# Patient Record
Sex: MALE | Race: WHITE | Employment: OTHER | URBAN - METROPOLITAN AREA
[De-identification: names, ages, dates, MRNs, and addresses within clinical notes are randomized per-mention and may not be internally consistent; named-entity substitution may affect disease eponyms.]

---

## 2017-02-23 ENCOUNTER — HOSPITAL ENCOUNTER (OUTPATIENT)
Dept: LAB | Age: 82
Discharge: HOME | End: 2017-02-23
Payer: MEDICARE

## 2017-02-23 LAB
ANION GAP SERPL CALCULATED.3IONS-SCNC: 10 MEQ/L (ref 7–13)
BUN BLDV-MCNC: 26 MG/DL (ref 8–23)
CALCIUM SERPL-MCNC: 9.4 MG/DL (ref 8.6–10.2)
CHLORIDE BLD-SCNC: 103 MEQ/L (ref 98–107)
CHOLESTEROL, TOTAL: 195 MG/DL (ref 0–199)
CO2: 24 MEQ/L (ref 22–29)
CREAT SERPL-MCNC: 1.2 MG/DL (ref 0.7–1.2)
GFR AFRICAN AMERICAN: >60
GFR NON-AFRICAN AMERICAN: 58
GLUCOSE BLD-MCNC: 87 MG/DL (ref 74–109)
HDLC SERPL-MCNC: 39 MG/DL (ref 40–59)
LDL CHOLESTEROL CALCULATED: 127 MG/DL (ref 0–129)
POTASSIUM SERPL-SCNC: 4.7 MEQ/L (ref 3.5–5.1)
SODIUM BLD-SCNC: 137 MEQ/L (ref 132–144)
TRIGL SERPL-MCNC: 144 MG/DL (ref 0–200)

## 2017-02-23 PROCEDURE — 80048 BASIC METABOLIC PNL TOTAL CA: CPT

## 2017-02-23 PROCEDURE — 36415 COLL VENOUS BLD VENIPUNCTURE: CPT

## 2017-02-23 PROCEDURE — 80061 LIPID PANEL: CPT

## 2017-05-03 ENCOUNTER — TELEPHONE (OUTPATIENT)
Dept: OTHER | Facility: CLINIC | Age: 82
End: 2017-05-03

## 2017-07-25 ENCOUNTER — HOSPITAL ENCOUNTER (OUTPATIENT)
Age: 82
Discharge: HOME | End: 2017-07-25
Payer: MEDICARE

## 2017-07-25 ENCOUNTER — HOSPITAL ENCOUNTER (OUTPATIENT)
Dept: GENERAL RADIOLOGY | Age: 82
Discharge: HOME | End: 2017-07-25
Payer: MEDICARE

## 2017-07-25 DIAGNOSIS — M25.551 RIGHT HIP PAIN: ICD-10-CM

## 2017-07-25 PROCEDURE — 73502 X-RAY EXAM HIP UNI 2-3 VIEWS: CPT

## 2018-02-20 ENCOUNTER — HOSPITAL ENCOUNTER (OUTPATIENT)
Dept: LAB | Age: 83
Discharge: HOME | End: 2018-02-20
Payer: MEDICARE

## 2018-02-20 LAB
ANION GAP SERPL CALCULATED.3IONS-SCNC: 16 MEQ/L (ref 7–13)
BUN BLDV-MCNC: 26 MG/DL (ref 8–23)
CALCIUM SERPL-MCNC: 9.5 MG/DL (ref 8.6–10.2)
CHLORIDE BLD-SCNC: 104 MEQ/L (ref 98–107)
CHOLESTEROL, TOTAL: 161 MG/DL (ref 0–199)
CO2: 23 MEQ/L (ref 22–29)
CREAT SERPL-MCNC: 1.09 MG/DL (ref 0.7–1.2)
GFR AFRICAN AMERICAN: >60
GFR NON-AFRICAN AMERICAN: >60
GLUCOSE BLD-MCNC: 89 MG/DL (ref 74–109)
HDLC SERPL-MCNC: 38 MG/DL (ref 40–59)
LDL CHOLESTEROL CALCULATED: 102 MG/DL (ref 0–129)
POTASSIUM SERPL-SCNC: 4.8 MEQ/L (ref 3.5–5.1)
SODIUM BLD-SCNC: 143 MEQ/L (ref 132–144)
TRIGL SERPL-MCNC: 105 MG/DL (ref 0–200)

## 2018-02-20 PROCEDURE — 80048 BASIC METABOLIC PNL TOTAL CA: CPT

## 2018-02-20 PROCEDURE — 36415 COLL VENOUS BLD VENIPUNCTURE: CPT

## 2018-02-20 PROCEDURE — 80061 LIPID PANEL: CPT

## 2018-02-21 ENCOUNTER — HOSPITAL ENCOUNTER (OUTPATIENT)
Dept: LAB | Age: 83
Discharge: HOME | End: 2018-02-21
Payer: MEDICARE

## 2018-02-21 LAB
HCT VFR BLD CALC: 44.7 % (ref 42–52)
HEMOGLOBIN: 14.9 G/DL (ref 14–18)
MCH RBC QN AUTO: 30.8 PG (ref 27–31.3)
MCHC RBC AUTO-ENTMCNC: 33.3 % (ref 33–37)
MCV RBC AUTO: 92.5 FL (ref 80–100)
PDW BLD-RTO: 14.3 % (ref 11.5–14.5)
PLATELET # BLD: 222 K/UL (ref 130–400)
RBC # BLD: 4.83 M/UL (ref 4.7–6.1)
TSH SERPL DL<=0.05 MIU/L-ACNC: 3.01 UIU/ML (ref 0.27–4.2)
WBC # BLD: 7.2 K/UL (ref 4.8–10.8)

## 2018-02-21 PROCEDURE — 36415 COLL VENOUS BLD VENIPUNCTURE: CPT

## 2018-02-21 PROCEDURE — 85027 COMPLETE CBC AUTOMATED: CPT

## 2018-02-21 PROCEDURE — 84443 ASSAY THYROID STIM HORMONE: CPT

## 2018-03-23 ENCOUNTER — HOSPITAL ENCOUNTER (OUTPATIENT)
Dept: CARDIAC CATH/INVASIVE PROCEDURES | Age: 83
Discharge: HOME | End: 2018-03-23
Attending: INTERNAL MEDICINE | Admitting: INTERNAL MEDICINE
Payer: MEDICARE

## 2018-03-23 VITALS — WEIGHT: 180 LBS | BODY MASS INDEX: 24.38 KG/M2 | HEIGHT: 72 IN

## 2018-03-23 PROBLEM — R06.02 SOB (SHORTNESS OF BREATH) ON EXERTION: Status: ACTIVE | Noted: 2018-03-23

## 2018-03-23 PROBLEM — I25.10 CORONARY ARTERY DISEASE INVOLVING NATIVE CORONARY ARTERY: Status: ACTIVE | Noted: 2018-03-23

## 2018-03-23 PROCEDURE — 6360000002 HC RX W HCPCS

## 2018-03-23 PROCEDURE — 6370000000 HC RX 637 (ALT 250 FOR IP)

## 2018-03-23 PROCEDURE — 2580000003 HC RX 258: Performed by: INTERNAL MEDICINE

## 2018-03-23 PROCEDURE — 2580000003 HC RX 258

## 2018-03-23 PROCEDURE — C1887 CATHETER, GUIDING: HCPCS

## 2018-03-23 PROCEDURE — 2500000003 HC RX 250 WO HCPCS

## 2018-03-23 PROCEDURE — 93458 L HRT ARTERY/VENTRICLE ANGIO: CPT | Performed by: INTERNAL MEDICINE

## 2018-03-23 PROCEDURE — C1769 GUIDE WIRE: HCPCS

## 2018-03-23 PROCEDURE — 92978 ENDOLUMINL IVUS OCT C 1ST: CPT | Performed by: INTERNAL MEDICINE

## 2018-03-23 PROCEDURE — 2720000001 HC MISC SURG SUPPLY STERILE $51-500

## 2018-03-23 PROCEDURE — 85347 COAGULATION TIME ACTIVATED: CPT

## 2018-03-23 PROCEDURE — C1753 CATH, INTRAVAS ULTRASOUND: HCPCS

## 2018-03-23 PROCEDURE — C1894 INTRO/SHEATH, NON-LASER: HCPCS

## 2018-03-23 PROCEDURE — C1725 CATH, TRANSLUMIN NON-LASER: HCPCS

## 2018-03-23 RX ORDER — SODIUM CHLORIDE 0.9 % (FLUSH) 0.9 %
10 SYRINGE (ML) INJECTION PRN
Status: CANCELLED | OUTPATIENT
Start: 2018-03-23

## 2018-03-23 RX ORDER — SODIUM CHLORIDE 0.9 % (FLUSH) 0.9 %
10 SYRINGE (ML) INJECTION PRN
Status: DISCONTINUED | OUTPATIENT
Start: 2018-03-23 | End: 2018-03-23 | Stop reason: HOSPADM

## 2018-03-23 RX ORDER — SODIUM CHLORIDE 0.9 % (FLUSH) 0.9 %
10 SYRINGE (ML) INJECTION EVERY 12 HOURS SCHEDULED
Status: DISCONTINUED | OUTPATIENT
Start: 2018-03-23 | End: 2018-03-23 | Stop reason: HOSPADM

## 2018-03-23 RX ORDER — CETIRIZINE HYDROCHLORIDE 5 MG/1
5 TABLET ORAL DAILY
Status: ON HOLD | COMMUNITY
End: 2021-05-05

## 2018-03-23 RX ORDER — ONDANSETRON 2 MG/ML
4 INJECTION INTRAMUSCULAR; INTRAVENOUS EVERY 6 HOURS PRN
Status: CANCELLED | OUTPATIENT
Start: 2018-03-23

## 2018-03-23 RX ORDER — SODIUM CHLORIDE 0.9 % (FLUSH) 0.9 %
10 SYRINGE (ML) INJECTION EVERY 12 HOURS SCHEDULED
Status: CANCELLED | OUTPATIENT
Start: 2018-03-23

## 2018-03-23 RX ORDER — NITROGLYCERIN 0.4 MG/1
0.4 TABLET SUBLINGUAL EVERY 5 MIN PRN
Status: DISCONTINUED | OUTPATIENT
Start: 2018-03-23 | End: 2018-03-23 | Stop reason: HOSPADM

## 2018-03-23 RX ORDER — BENAZEPRIL HYDROCHLORIDE 5 MG/1
10 TABLET, FILM COATED ORAL DAILY
COMMUNITY
End: 2018-11-20 | Stop reason: ALTCHOICE

## 2018-03-23 RX ORDER — ANTIOX #8/OM3/DHA/EPA/LUT/ZEAX 250-2.5 MG
1 CAPSULE ORAL DAILY
COMMUNITY

## 2018-03-23 RX ORDER — ALPRAZOLAM 0.5 MG/1
0.5 TABLET ORAL
Status: DISCONTINUED | OUTPATIENT
Start: 2018-03-23 | End: 2018-03-23 | Stop reason: HOSPADM

## 2018-03-23 RX ORDER — AMLODIPINE BESYLATE 5 MG/1
5 TABLET ORAL DAILY
COMMUNITY
End: 2019-10-03 | Stop reason: ALTCHOICE

## 2018-03-23 RX ORDER — NITROGLYCERIN 0.4 MG/1
TABLET SUBLINGUAL
Qty: 25 TABLET | Refills: 3 | OUTPATIENT
Start: 2018-03-23

## 2018-03-23 RX ORDER — ACETAMINOPHEN 325 MG/1
650 TABLET ORAL EVERY 4 HOURS PRN
Status: CANCELLED | OUTPATIENT
Start: 2018-03-23

## 2018-03-23 RX ORDER — METOPROLOL SUCCINATE 25 MG/1
25 TABLET, EXTENDED RELEASE ORAL DAILY
COMMUNITY
End: 2018-11-20 | Stop reason: ALTCHOICE

## 2018-03-23 RX ORDER — ASPIRIN 325 MG
325 TABLET ORAL DAILY
Status: ON HOLD | COMMUNITY
End: 2018-03-23 | Stop reason: HOSPADM

## 2018-03-23 RX ORDER — SODIUM CHLORIDE 9 MG/ML
INJECTION, SOLUTION INTRAVENOUS CONTINUOUS
Status: DISCONTINUED | OUTPATIENT
Start: 2018-03-23 | End: 2018-03-23 | Stop reason: HOSPADM

## 2018-03-23 RX ORDER — LOVASTATIN 40 MG/1
40 TABLET ORAL NIGHTLY
COMMUNITY
End: 2018-11-20 | Stop reason: ALTCHOICE

## 2018-03-23 RX ADMIN — SODIUM CHLORIDE: 9 INJECTION, SOLUTION INTRAVENOUS at 10:51

## 2018-03-23 NOTE — OP NOTE
INDICATIONS:  The patient is a 80 y.o. male  with abnormal EKG, and acute on chronic exertional shortness of breath, with markedly abnormal stress test.    PROCEDURE:  Left heart catheterization,selective coronary angiography, left ventriculography and selective right common femoral angiography was performed. Benefits, alternatives and risks of the procedure were explained to the patient to include but not limited to MI, Stroke, Death, Allergic reaction to dye, bleeding, risk of kidney injury, and possible need for emergent coronary artery bypass grafting and informed consent was obtained. The patient was brought to the cath lab and was prepped and draped in the normal sterile technique. IV conscious sedation was maintained. 1% lidocaine was used for local anesthesia. DESCRIPTION OF PROCEDURE: Under local anesthesia, a 5/6 Mongolian short sheath was placed in the right radial artery. All catheter exchanges were made over a 0.035 guidewire. A 5 Mongolian angled pigtail catheter was advanced across the aortic valve into the left ventricle, and left ventriculography was performed. Left ventricular end-diastolic pressure was measured and a slow manual pullback was performed across the aortic valve. A 5 Western Renée Lina catheter was then advanced, and the right coronary artery was selectively engaged. Right coronary angiography was performed in multiple orthogonal views. The 5 [place] catheter would not selectively engage the left main coronary artery, and was removed. A 5 Western Renée JL 3.5 diagnostic catheter was then advanced over a 0.035 guidewire and the left main coronary artery was selectively engaged. Selective coronary angiography of the left system was performed in multiple orthogonal views. At this point, it was decided to proceed with PCI/stenting of the proximal and mid left anterior descending artery.   There was also concern about ostial left main stenosis, and intravascular ultrasound of the next week.     Casey Diaz MD

## 2018-03-23 NOTE — PROGRESS NOTES
Pt arrived to pre/post cath holding in stable condition. R TR band intact intact and R groin dressing clean and dry.

## 2018-03-24 NOTE — H&P
[address]                       [address], [address] [facility]                               HISTORY AND PHYSICAL    PATIENT NAME: Bettina Kiser                     :        1935  MED REC NO:   [id number]                            ROOM:  ACCOUNT NO:   [de-identified]                           ADMIT DATE: 2018  PROVIDER:     Aneesh Whiteside MD    HISTORY OF PRESENT ILLNESS:  The patient is an 30-year-old gentleman who  presented for evaluation of exertional shortness of breath and abnormal  EKG. He quit smoking more than 30 years ago. Probably, he has less than  20-pack year history of smoking. He has had chronic exertional shortness  of breath for the past several years, functional class II, and of late  i.e., over the past 3 to 6 months he has noticed a worsening of his  shortness of breath and a decrease in his functional capacity. His EKG  showed loss of R-wave progression across the anterior precordial leads. A  treadmill stress test was done, and had to be stopped at a very low  workload of 2.5 METS with accelerated chronotropic response and  desaturation with exercise. Blood pressure response to exercise was  normal.  An echocardiogram was performed, preliminary impression is that  the LV systolic function is normal and the valves are grossly normal.  The  patient does have a history of hypertension. FAMILY HISTORY:  For premature coronary artery disease is noncontributory  given patient's age. SOCIAL HISTORY:  The patient denies illicit drugs, he is a former smoker,  and uses caffeinated beverages 2 to 3 times a day. MEDICATIONS:  Amlodipine 5 mg daily, aspirin, benazepril 5 mg daily,  Lovastatin 40 mg daily, metoprolol succinate ER 25 mg daily, and  PreserVision eye drops. ALLERGIES:  He has no known drug allergies. PHYSICAL EXAMINATION:  GENERAL:  He is alert and oriented x3.   VITAL SIGNS:  Shows blood pressure of 130/70 and heart

## 2018-03-25 LAB
PERFORMED ON: ABNORMAL
PERFORMED ON: ABNORMAL
POC ACTIVATED CLOTTING TIME KAOLIN: 202 SEC (ref 82–152)
POC ACTIVATED CLOTTING TIME KAOLIN: 241 SEC (ref 82–152)
POC SAMPLE TYPE: ABNORMAL
POC SAMPLE TYPE: ABNORMAL

## 2018-03-27 ENCOUNTER — HOSPITAL ENCOUNTER (OUTPATIENT)
Dept: CARDIAC CATH/INVASIVE PROCEDURES | Age: 83
Discharge: HOME | End: 2018-03-27
Attending: INTERNAL MEDICINE | Admitting: INTERNAL MEDICINE
Payer: MEDICARE

## 2018-03-27 VITALS — WEIGHT: 180 LBS | HEIGHT: 72 IN | BODY MASS INDEX: 24.38 KG/M2

## 2018-03-27 LAB
EKG ATRIAL RATE: 83 BPM
EKG P AXIS: 28 DEGREES
EKG P-R INTERVAL: 156 MS
EKG Q-T INTERVAL: 366 MS
EKG QRS DURATION: 114 MS
EKG QTC CALCULATION (BAZETT): 430 MS
EKG R AXIS: -51 DEGREES
EKG T AXIS: 69 DEGREES
EKG VENTRICULAR RATE: 83 BPM

## 2018-03-27 PROCEDURE — C1894 INTRO/SHEATH, NON-LASER: HCPCS

## 2018-03-27 PROCEDURE — 2720000001 HC MISC SURG SUPPLY STERILE $51-500

## 2018-03-27 PROCEDURE — 2580000003 HC RX 258

## 2018-03-27 PROCEDURE — C1874 STENT, COATED/COV W/DEL SYS: HCPCS

## 2018-03-27 PROCEDURE — C1760 CLOSURE DEV, VASC: HCPCS

## 2018-03-27 PROCEDURE — 2580000003 HC RX 258: Performed by: INTERNAL MEDICINE

## 2018-03-27 PROCEDURE — 6360000002 HC RX W HCPCS

## 2018-03-27 PROCEDURE — 93005 ELECTROCARDIOGRAM TRACING: CPT

## 2018-03-27 PROCEDURE — 6370000000 HC RX 637 (ALT 250 FOR IP): Performed by: INTERNAL MEDICINE

## 2018-03-27 PROCEDURE — 2500000003 HC RX 250 WO HCPCS

## 2018-03-27 PROCEDURE — C1769 GUIDE WIRE: HCPCS

## 2018-03-27 PROCEDURE — C9600 PERC DRUG-EL COR STENT SING: HCPCS | Performed by: INTERNAL MEDICINE

## 2018-03-27 PROCEDURE — C1725 CATH, TRANSLUMIN NON-LASER: HCPCS

## 2018-03-27 PROCEDURE — C1887 CATHETER, GUIDING: HCPCS

## 2018-03-27 RX ORDER — NITROGLYCERIN 0.4 MG/1
0.4 TABLET SUBLINGUAL EVERY 5 MIN PRN
Status: DISCONTINUED | OUTPATIENT
Start: 2018-03-27 | End: 2018-03-27 | Stop reason: HOSPADM

## 2018-03-27 RX ORDER — ALPRAZOLAM 0.5 MG/1
0.5 TABLET ORAL
Status: DISCONTINUED | OUTPATIENT
Start: 2018-03-27 | End: 2018-03-27 | Stop reason: HOSPADM

## 2018-03-27 RX ORDER — ASPIRIN 81 MG/1
81 TABLET, CHEWABLE ORAL ONCE
Status: COMPLETED | OUTPATIENT
Start: 2018-03-27 | End: 2018-03-27

## 2018-03-27 RX ORDER — SODIUM CHLORIDE 9 MG/ML
INJECTION, SOLUTION INTRAVENOUS CONTINUOUS
Status: CANCELLED | OUTPATIENT
Start: 2018-03-27 | End: 2018-03-27

## 2018-03-27 RX ORDER — ONDANSETRON 2 MG/ML
4 INJECTION INTRAMUSCULAR; INTRAVENOUS EVERY 6 HOURS PRN
Status: CANCELLED | OUTPATIENT
Start: 2018-03-27

## 2018-03-27 RX ORDER — METOPROLOL SUCCINATE 25 MG/1
25 TABLET, EXTENDED RELEASE ORAL ONCE
Status: COMPLETED | OUTPATIENT
Start: 2018-03-27 | End: 2018-03-27

## 2018-03-27 RX ORDER — SODIUM CHLORIDE 9 MG/ML
INJECTION, SOLUTION INTRAVENOUS CONTINUOUS
Status: DISCONTINUED | OUTPATIENT
Start: 2018-03-27 | End: 2018-03-27 | Stop reason: HOSPADM

## 2018-03-27 RX ORDER — SODIUM CHLORIDE 0.9 % (FLUSH) 0.9 %
10 SYRINGE (ML) INJECTION PRN
Status: CANCELLED | OUTPATIENT
Start: 2018-03-27

## 2018-03-27 RX ORDER — DOCUSATE SODIUM 100 MG/1
100 CAPSULE, LIQUID FILLED ORAL 2 TIMES DAILY
Status: CANCELLED | OUTPATIENT
Start: 2018-03-27

## 2018-03-27 RX ORDER — 0.9 % SODIUM CHLORIDE 0.9 %
500 INTRAVENOUS SOLUTION INTRAVENOUS ONCE
Status: CANCELLED | OUTPATIENT
Start: 2018-03-27 | End: 2018-03-27

## 2018-03-27 RX ORDER — ASPIRIN 81 MG/1
81 TABLET ORAL DAILY
Status: CANCELLED | OUTPATIENT
Start: 2018-03-28

## 2018-03-27 RX ORDER — SODIUM CHLORIDE 0.9 % (FLUSH) 0.9 %
10 SYRINGE (ML) INJECTION EVERY 12 HOURS SCHEDULED
Status: CANCELLED | OUTPATIENT
Start: 2018-03-27

## 2018-03-27 RX ORDER — HYDRALAZINE HYDROCHLORIDE 20 MG/ML
10 INJECTION INTRAMUSCULAR; INTRAVENOUS EVERY 10 MIN PRN
Status: CANCELLED | OUTPATIENT
Start: 2018-03-27

## 2018-03-27 RX ORDER — SODIUM CHLORIDE 0.9 % (FLUSH) 0.9 %
10 SYRINGE (ML) INJECTION EVERY 12 HOURS SCHEDULED
Status: DISCONTINUED | OUTPATIENT
Start: 2018-03-27 | End: 2018-03-27 | Stop reason: HOSPADM

## 2018-03-27 RX ORDER — ATROPINE SULFATE 0.4 MG/ML
0.6 AMPUL (ML) INJECTION
Status: CANCELLED | OUTPATIENT
Start: 2018-03-27 | End: 2018-03-27

## 2018-03-27 RX ORDER — ACETAMINOPHEN 325 MG/1
650 TABLET ORAL EVERY 4 HOURS PRN
Status: CANCELLED | OUTPATIENT
Start: 2018-03-27

## 2018-03-27 RX ORDER — ASPIRIN 81 MG/1
81 TABLET, CHEWABLE ORAL DAILY
COMMUNITY

## 2018-03-27 RX ORDER — NITROGLYCERIN 0.4 MG/1
TABLET SUBLINGUAL
Qty: 25 TABLET | Refills: 3 | Status: SHIPPED | OUTPATIENT
Start: 2018-03-27

## 2018-03-27 RX ADMIN — SODIUM CHLORIDE: 9 INJECTION, SOLUTION INTRAVENOUS at 09:12

## 2018-03-27 RX ADMIN — ASPIRIN 81 MG 81 MG: 81 TABLET ORAL at 09:15

## 2018-03-27 RX ADMIN — METOPROLOL SUCCINATE 25 MG: 25 TABLET, FILM COATED, EXTENDED RELEASE ORAL at 09:12

## 2018-03-27 NOTE — DISCHARGE SUMMARY
Hospital Medicine Discharge Summary    Evelyn Whitlock  :  1935  MRN:  [id number]    Admit date:  3/27/2018  Discharge date:  3/27/2018    Admitting Physician:  Tommy Hernandez MD  Primary Care Physician:  Moises Espino MD      Discharge Diagnoses: Active Problems:    * No active hospital problems. *  Resolved Problems:    * No resolved hospital problems. *      PROCEDURES:    Patient underwent PCI and drug-eluting stent to the proximal and mid left anterior descending artery. Please see separate report for details. Hospital Course:   Evelyn Whitlock is a 80 y.o. male that was admitted and treated at [facility] for the following medical issues:   Poor exercise tolerance, abnormal EKG and abnormal stress test.  Recent cardiac catheterization identified flow-limiting disease in the left anterior descending artery distribution and borderline disease in the right coronary artery. Left ventricular ejection fraction preserved patient returns for PCI and stenting of the left anterior descending artery, performed today, see note for details. Intervention was done to the proximal and mid left anterior descending artery using drug-eluting stents. Reactive there were no immediate complications. PHYSICAL EXAM  Alert and oriented, distant breath sounds, regular rate and rhythm, soft and soft ecchymosis right groin. Distal pulses intact  LAB:  Recent Results (from the past 72 hour(s))   Electrocardiogram, 12-lead     Collection Time: 18  9:01 AM   Result Value Ref Range    Ventricular Rate 83 BPM    Atrial Rate 83 BPM    P-R Interval 156 ms    QRS Duration 114 ms    Q-T Interval 366 ms    QTc Calculation (Bazett) 430 ms    P Axis 28 degrees    R Axis -51 degrees    T Axis 69 degrees       Imaging Studies:    No results found. Discharge Medications:  Current Discharge Medication List           Details   nitroGLYCERIN (NITROSTAT) 0.4 MG SL tablet up to max of 3 total doses.  If no relief

## 2018-03-27 NOTE — DISCHARGE INSTR - ACTIVITY
You may shower in AM,  Remove dressing after showering. No driving for 24 hours. Avoid heavy lifting, straining and excessive bending at the catheter insertion site for 48 hours after discharge.

## 2018-03-27 NOTE — OP NOTE
PTCA/SHRUTI to proximal and mid LAD:    Clinical indication: class III angina pectoris, manifesting as exertional shortness of breath. Under local anesthesia is 6 Western Renée short sheath was placed in the right common femoral artery by single anterior percutaneous stick. Selective right common femoral angiography showed that the axis was in the right common femoral artery above its bifurcation. A 6 Monegasque XB 3.5 guide catheter was advanced over 0.035 guidewire and the left main coronary artery was selectively engaged. Heparin was administered to maintain ACT close to 300 seconds. A 0.014,180 centimeters Run-through wire was advanced into the distal left anterior descending artery. A 2.5 x 12 mm trek balloon was advanced into the mid left anterior descending artery and the lesion was dilated to 8 shirley. The balloon was removed. A  3.0 x 18 mm Xience  Alpine drug-eluting stent was then advanced to the mid left anterior descending artery and deployed at 10 shirley. The stent balloon was removed. A 2.75 x 8 mm Xience Alpine drug-eluting stent was then advanced distal to the initial stent, and deployed at 10 shirley with minimal overlap. Overlapping inflation was performed using the stent balloon to 15 shirley. The stent balloon was removed. A 3.0 x 12 mm trek balloon was advanced to the proximal left anterior descending artery and the lesion was dilated to 13 shirley. The balloon was removed. A 4.0 x 12 mm Xience Alpine drug-eluting stent was deployed in the proximal left anterior descending artery at 12 shirley. The stent balloon was removed. A 4.0 x 8 mm noncompliant trek balloon was advanced to the proximal left anterior descending artery and the stent was postdilated to 15 shirley. The stent balloon was removed. Final angiographic results showed 0% residual stenosis with JUAN LABERTO 3 flow and no dissection.   There was some plaque shift into the ostium of the diagonal branch which arose from within the segment of disease in the mid left anterior descending artery, however there was JUAN ALBERTO-3 flow, and further intervention to the ostium of the diagonal branch was deferred at this time. Summary:    PCI and stenting of the mid left anterior descending artery was performed. 90% tubular stenosis was reduced to 0% residual stenosis. 3.0 x 18 mm Xience Alpine drug-eluting stent and 2.75 x 8 mm Xience Alpine drug-eluting stent was deployed in a minimally overlapping fashion. Final results showed 0% residual stenosis with JUAN ALBERTO 3 flow and no dissection. PCI and drug-eluting stent was performed in the proximal left anterior descending artery. The 90% stenosis was reduced to 0% residual stenosis with JUAN ALBERTO 3 flow and no dissection. 4.0 x 12 mm Xience Alpine drug-eluting stent was deployed. Plan at this time is to observe RCA disease. Patient will be followed closely. Ongoing risk factor modification and dual antiplatelet therapy are recommended.

## 2018-03-27 NOTE — PROGRESS NOTES
Patient returned from the cath lab and right groin is soft and dry. But bruised from Friday's procedure. Family is at the bedside.

## 2018-03-28 PROCEDURE — 93010 ELECTROCARDIOGRAM REPORT: CPT | Performed by: INTERNAL MEDICINE

## 2018-04-06 ENCOUNTER — HOSPITAL ENCOUNTER (OUTPATIENT)
Dept: CARDIAC REHAB | Age: 83
Setting detail: THERAPIES SERIES
Discharge: HOME | End: 2018-04-06
Payer: MEDICARE

## 2018-04-06 PROCEDURE — 93798 PHYS/QHP OP CAR RHAB W/ECG: CPT

## 2018-04-09 ENCOUNTER — HOSPITAL ENCOUNTER (OUTPATIENT)
Dept: CARDIAC REHAB | Age: 83
Setting detail: THERAPIES SERIES
Discharge: HOME | End: 2018-04-09
Payer: MEDICARE

## 2018-04-09 PROCEDURE — 93798 PHYS/QHP OP CAR RHAB W/ECG: CPT

## 2018-04-11 ENCOUNTER — HOSPITAL ENCOUNTER (OUTPATIENT)
Dept: CARDIAC REHAB | Age: 83
Setting detail: THERAPIES SERIES
Discharge: HOME | End: 2018-04-11
Payer: MEDICARE

## 2018-04-11 PROCEDURE — 93798 PHYS/QHP OP CAR RHAB W/ECG: CPT

## 2018-04-16 ENCOUNTER — HOSPITAL ENCOUNTER (OUTPATIENT)
Dept: CARDIAC REHAB | Age: 83
Setting detail: THERAPIES SERIES
Discharge: HOME | End: 2018-04-16
Payer: MEDICARE

## 2018-04-16 PROCEDURE — 93798 PHYS/QHP OP CAR RHAB W/ECG: CPT

## 2018-04-18 ENCOUNTER — HOSPITAL ENCOUNTER (OUTPATIENT)
Dept: CARDIAC REHAB | Age: 83
Setting detail: THERAPIES SERIES
Discharge: HOME | End: 2018-04-18
Payer: MEDICARE

## 2018-04-18 PROCEDURE — 93798 PHYS/QHP OP CAR RHAB W/ECG: CPT

## 2018-04-23 ENCOUNTER — HOSPITAL ENCOUNTER (OUTPATIENT)
Dept: CARDIAC REHAB | Age: 83
Setting detail: THERAPIES SERIES
Discharge: HOME | End: 2018-04-23
Payer: MEDICARE

## 2018-04-23 PROCEDURE — 93798 PHYS/QHP OP CAR RHAB W/ECG: CPT

## 2018-04-25 ENCOUNTER — HOSPITAL ENCOUNTER (OUTPATIENT)
Dept: CARDIAC REHAB | Age: 83
Setting detail: THERAPIES SERIES
Discharge: HOME | End: 2018-04-25
Payer: MEDICARE

## 2018-04-25 PROCEDURE — 93798 PHYS/QHP OP CAR RHAB W/ECG: CPT

## 2018-04-27 ENCOUNTER — HOSPITAL ENCOUNTER (OUTPATIENT)
Dept: PULMONOLOGY | Age: 83
Discharge: HOME | End: 2018-04-27
Payer: MEDICARE

## 2018-04-27 PROCEDURE — 6360000002 HC RX W HCPCS: Performed by: INTERNAL MEDICINE

## 2018-04-27 PROCEDURE — 94726 PLETHYSMOGRAPHY LUNG VOLUMES: CPT

## 2018-04-27 PROCEDURE — 94729 DIFFUSING CAPACITY: CPT

## 2018-04-27 PROCEDURE — 94726 PLETHYSMOGRAPHY LUNG VOLUMES: CPT | Performed by: INTERNAL MEDICINE

## 2018-04-27 PROCEDURE — 94060 EVALUATION OF WHEEZING: CPT

## 2018-04-27 PROCEDURE — 94060 EVALUATION OF WHEEZING: CPT | Performed by: INTERNAL MEDICINE

## 2018-04-27 PROCEDURE — 94729 DIFFUSING CAPACITY: CPT | Performed by: INTERNAL MEDICINE

## 2018-04-27 RX ORDER — ALBUTEROL SULFATE 2.5 MG/3ML
2.5 SOLUTION RESPIRATORY (INHALATION) ONCE
Status: COMPLETED | OUTPATIENT
Start: 2018-04-27 | End: 2018-04-27

## 2018-04-27 RX ADMIN — ALBUTEROL SULFATE 2.5 MG: 2.5 SOLUTION RESPIRATORY (INHALATION) at 12:27

## 2018-04-30 ENCOUNTER — HOSPITAL ENCOUNTER (OUTPATIENT)
Dept: CARDIAC REHAB | Age: 83
Setting detail: THERAPIES SERIES
Discharge: HOME | End: 2018-04-30
Payer: MEDICARE

## 2018-04-30 PROCEDURE — 93798 PHYS/QHP OP CAR RHAB W/ECG: CPT

## 2018-05-02 ENCOUNTER — HOSPITAL ENCOUNTER (OUTPATIENT)
Dept: CARDIAC REHAB | Age: 83
Setting detail: THERAPIES SERIES
Discharge: HOME | End: 2018-05-02
Payer: MEDICARE

## 2018-05-02 PROCEDURE — 93798 PHYS/QHP OP CAR RHAB W/ECG: CPT

## 2018-05-07 ENCOUNTER — HOSPITAL ENCOUNTER (OUTPATIENT)
Dept: CARDIAC REHAB | Age: 83
Setting detail: THERAPIES SERIES
Discharge: HOME | End: 2018-05-07
Payer: MEDICARE

## 2018-05-07 PROCEDURE — 93798 PHYS/QHP OP CAR RHAB W/ECG: CPT

## 2018-05-09 ENCOUNTER — HOSPITAL ENCOUNTER (OUTPATIENT)
Dept: CARDIAC REHAB | Age: 83
Setting detail: THERAPIES SERIES
Discharge: HOME | End: 2018-05-09
Payer: MEDICARE

## 2018-05-09 PROCEDURE — 93798 PHYS/QHP OP CAR RHAB W/ECG: CPT

## 2018-05-14 ENCOUNTER — HOSPITAL ENCOUNTER (OUTPATIENT)
Dept: CARDIAC REHAB | Age: 83
Setting detail: THERAPIES SERIES
Discharge: HOME | End: 2018-05-14
Payer: MEDICARE

## 2018-05-14 PROCEDURE — 93798 PHYS/QHP OP CAR RHAB W/ECG: CPT

## 2018-05-16 ENCOUNTER — HOSPITAL ENCOUNTER (OUTPATIENT)
Dept: CARDIAC REHAB | Age: 83
Setting detail: THERAPIES SERIES
Discharge: HOME | End: 2018-05-16
Payer: MEDICARE

## 2018-05-16 PROCEDURE — 93798 PHYS/QHP OP CAR RHAB W/ECG: CPT

## 2018-05-21 ENCOUNTER — HOSPITAL ENCOUNTER (OUTPATIENT)
Dept: CARDIAC REHAB | Age: 83
Setting detail: THERAPIES SERIES
Discharge: HOME | End: 2018-05-21
Payer: MEDICARE

## 2018-05-21 PROCEDURE — 93798 PHYS/QHP OP CAR RHAB W/ECG: CPT

## 2018-05-23 ENCOUNTER — HOSPITAL ENCOUNTER (OUTPATIENT)
Dept: CARDIAC REHAB | Age: 83
Setting detail: THERAPIES SERIES
Discharge: HOME | End: 2018-05-23
Payer: MEDICARE

## 2018-05-23 PROCEDURE — 93798 PHYS/QHP OP CAR RHAB W/ECG: CPT

## 2018-05-29 ENCOUNTER — HOSPITAL ENCOUNTER (OUTPATIENT)
Dept: CARDIAC REHAB | Age: 83
Setting detail: THERAPIES SERIES
Discharge: HOME | End: 2018-05-29
Payer: MEDICARE

## 2018-05-29 PROCEDURE — 93798 PHYS/QHP OP CAR RHAB W/ECG: CPT

## 2018-05-30 ENCOUNTER — HOSPITAL ENCOUNTER (OUTPATIENT)
Dept: CARDIAC REHAB | Age: 83
Setting detail: THERAPIES SERIES
Discharge: HOME | End: 2018-05-30
Payer: MEDICARE

## 2018-05-30 PROCEDURE — 93798 PHYS/QHP OP CAR RHAB W/ECG: CPT

## 2018-06-06 ENCOUNTER — HOSPITAL ENCOUNTER (OUTPATIENT)
Dept: CARDIAC REHAB | Age: 83
Setting detail: THERAPIES SERIES
Discharge: HOME | End: 2018-06-06
Payer: MEDICARE

## 2018-06-06 PROCEDURE — 93798 PHYS/QHP OP CAR RHAB W/ECG: CPT

## 2018-06-07 ENCOUNTER — HOSPITAL ENCOUNTER (OUTPATIENT)
Dept: CARDIAC REHAB | Age: 83
Setting detail: THERAPIES SERIES
Discharge: HOME | End: 2018-06-07
Payer: MEDICARE

## 2018-06-07 PROCEDURE — 93798 PHYS/QHP OP CAR RHAB W/ECG: CPT

## 2018-06-11 ENCOUNTER — HOSPITAL ENCOUNTER (OUTPATIENT)
Dept: CARDIAC REHAB | Age: 83
Setting detail: THERAPIES SERIES
Discharge: HOME | End: 2018-06-11
Payer: MEDICARE

## 2018-06-11 PROCEDURE — 93798 PHYS/QHP OP CAR RHAB W/ECG: CPT

## 2018-06-13 ENCOUNTER — HOSPITAL ENCOUNTER (OUTPATIENT)
Dept: CARDIAC REHAB | Age: 83
Setting detail: THERAPIES SERIES
Discharge: HOME | End: 2018-06-13
Payer: MEDICARE

## 2018-06-13 PROCEDURE — 93798 PHYS/QHP OP CAR RHAB W/ECG: CPT

## 2018-06-18 ENCOUNTER — HOSPITAL ENCOUNTER (OUTPATIENT)
Dept: CARDIAC REHAB | Age: 83
Setting detail: THERAPIES SERIES
Discharge: HOME | End: 2018-06-18
Payer: MEDICARE

## 2018-06-18 PROCEDURE — 93798 PHYS/QHP OP CAR RHAB W/ECG: CPT

## 2018-06-20 ENCOUNTER — HOSPITAL ENCOUNTER (OUTPATIENT)
Dept: CARDIAC REHAB | Age: 83
Setting detail: THERAPIES SERIES
Discharge: HOME | End: 2018-06-20
Payer: MEDICARE

## 2018-06-20 PROCEDURE — 93798 PHYS/QHP OP CAR RHAB W/ECG: CPT

## 2018-06-25 ENCOUNTER — HOSPITAL ENCOUNTER (OUTPATIENT)
Dept: CARDIAC REHAB | Age: 83
Setting detail: THERAPIES SERIES
Discharge: HOME | End: 2018-06-25
Payer: MEDICARE

## 2018-06-25 PROCEDURE — 93798 PHYS/QHP OP CAR RHAB W/ECG: CPT

## 2018-06-27 ENCOUNTER — HOSPITAL ENCOUNTER (OUTPATIENT)
Dept: CARDIAC REHAB | Age: 83
Setting detail: THERAPIES SERIES
Discharge: HOME | End: 2018-06-27
Payer: MEDICARE

## 2018-06-27 PROCEDURE — 93798 PHYS/QHP OP CAR RHAB W/ECG: CPT

## 2018-07-09 ENCOUNTER — HOSPITAL ENCOUNTER (OUTPATIENT)
Dept: CARDIAC REHAB | Age: 83
Setting detail: THERAPIES SERIES
Discharge: HOME | End: 2018-07-09
Payer: MEDICARE

## 2018-07-09 PROCEDURE — 93798 PHYS/QHP OP CAR RHAB W/ECG: CPT

## 2018-07-11 ENCOUNTER — HOSPITAL ENCOUNTER (OUTPATIENT)
Dept: GENERAL RADIOLOGY | Age: 83
Discharge: HOME | End: 2018-07-13
Payer: MEDICARE

## 2018-07-11 ENCOUNTER — OFFICE VISIT (OUTPATIENT)
Dept: PULMONOLOGY | Age: 83
End: 2018-07-11
Payer: MEDICARE

## 2018-07-11 VITALS
DIASTOLIC BLOOD PRESSURE: 74 MMHG | BODY MASS INDEX: 23.16 KG/M2 | HEART RATE: 92 BPM | WEIGHT: 171 LBS | TEMPERATURE: 96.9 F | OXYGEN SATURATION: 95 % | RESPIRATION RATE: 16 BRPM | SYSTOLIC BLOOD PRESSURE: 138 MMHG | HEIGHT: 72 IN

## 2018-07-11 DIAGNOSIS — R09.02 HYPOXIA: ICD-10-CM

## 2018-07-11 DIAGNOSIS — R06.02 SOB (SHORTNESS OF BREATH): Primary | ICD-10-CM

## 2018-07-11 DIAGNOSIS — R06.02 SOB (SHORTNESS OF BREATH): ICD-10-CM

## 2018-07-11 DIAGNOSIS — I25.10 CORONARY ARTERY DISEASE INVOLVING NATIVE CORONARY ARTERY OF NATIVE HEART WITHOUT ANGINA PECTORIS: ICD-10-CM

## 2018-07-11 PROCEDURE — 1123F ACP DISCUSS/DSCN MKR DOCD: CPT | Performed by: INTERNAL MEDICINE

## 2018-07-11 PROCEDURE — 1036F TOBACCO NON-USER: CPT | Performed by: INTERNAL MEDICINE

## 2018-07-11 PROCEDURE — 1101F PT FALLS ASSESS-DOCD LE1/YR: CPT | Performed by: INTERNAL MEDICINE

## 2018-07-11 PROCEDURE — G8420 CALC BMI NORM PARAMETERS: HCPCS | Performed by: INTERNAL MEDICINE

## 2018-07-11 PROCEDURE — 4040F PNEUMOC VAC/ADMIN/RCVD: CPT | Performed by: INTERNAL MEDICINE

## 2018-07-11 PROCEDURE — 99215 OFFICE O/P EST HI 40 MIN: CPT | Performed by: INTERNAL MEDICINE

## 2018-07-11 PROCEDURE — G8598 ASA/ANTIPLAT THER USED: HCPCS | Performed by: INTERNAL MEDICINE

## 2018-07-11 PROCEDURE — 71046 X-RAY EXAM CHEST 2 VIEWS: CPT

## 2018-07-11 PROCEDURE — G8427 DOCREV CUR MEDS BY ELIG CLIN: HCPCS | Performed by: INTERNAL MEDICINE

## 2018-07-11 RX ORDER — CEFDINIR 300 MG/1
300 CAPSULE ORAL 2 TIMES DAILY
COMMUNITY
End: 2019-10-03 | Stop reason: ALTCHOICE

## 2018-07-11 RX ORDER — GUAIFENESIN 600 MG/1
600 TABLET, EXTENDED RELEASE ORAL 2 TIMES DAILY
COMMUNITY
End: 2019-10-03 | Stop reason: ALTCHOICE

## 2018-07-11 ASSESSMENT — ENCOUNTER SYMPTOMS
VOMITING: 0
SORE THROAT: 0
NAUSEA: 0
CHEST TIGHTNESS: 0
WHEEZING: 0
ABDOMINAL PAIN: 0
DIARRHEA: 0
COUGH: 0
VOICE CHANGE: 0
EYE ITCHING: 0
RHINORRHEA: 0
SHORTNESS OF BREATH: 1

## 2018-07-11 NOTE — PROGRESS NOTES
Gastrointestinal: Negative for abdominal pain, diarrhea, nausea and vomiting. Genitourinary: Negative for difficulty urinating and hematuria. Musculoskeletal: Negative for arthralgias, joint swelling and myalgias. Skin: Negative for rash. Allergic/Immunologic: Negative for environmental allergies. Neurological: Negative for dizziness, tremors, weakness and headaches. Psychiatric/Behavioral: Negative for behavioral problems and sleep disturbance. Objective:     Vitals:    07/11/18 1423   BP: 138/74   Pulse: 92   Resp: 16   Temp: 96.9 °F (36.1 °C)   TempSrc: Tympanic   SpO2: 95%   Weight: 171 lb (77.6 kg)   Height: 6' (1.829 m)         Physical Exam   Constitutional: He is oriented to person, place, and time. He appears well-developed and well-nourished. HENT:   Head: Normocephalic and atraumatic. Nose: Nose normal.   Mouth/Throat: Oropharynx is clear and moist.   Eyes: Conjunctivae and EOM are normal. Pupils are equal, round, and reactive to light. Neck: No JVD present. No tracheal deviation present. No thyromegaly present. Cardiovascular: Normal rate and regular rhythm. Exam reveals no gallop and no friction rub. No murmur heard. Pulmonary/Chest: Effort normal and breath sounds normal. No respiratory distress. He has no wheezes. He has no rales. He exhibits no tenderness. diminished Breath sound bilaterally. Abdominal: He exhibits no distension. Musculoskeletal: Normal range of motion. Lymphadenopathy:     He has no cervical adenopathy. Neurological: He is alert and oriented to person, place, and time. No cranial nerve deficit. Skin: Skin is warm and dry. No rash noted. Psychiatric: He has a normal mood and affect. His behavior is normal.       Current Outpatient Prescriptions   Medication Sig Dispense Refill    aspirin 81 MG chewable tablet Take 81 mg by mouth daily      nitroGLYCERIN (NITROSTAT) 0.4 MG SL tablet up to max of 3 total doses.  If no relief after 1 dose, call 911. 25 tablet 3    amLODIPine (NORVASC) 5 MG tablet Take 5 mg by mouth daily      benazepril (LOTENSIN) 5 MG tablet Take 5 mg by mouth daily      cetirizine (ZYRTEC) 5 MG tablet Take 5 mg by mouth daily      lovastatin (MEVACOR) 40 MG tablet Take 40 mg by mouth nightly      metoprolol succinate (TOPROL XL) 25 MG extended release tablet Take 25 mg by mouth daily      Multiple Vitamins-Minerals (PRESERVISION AREDS 2) CAPS Take 1 tablet by mouth daily      ticagrelor (BRILINTA) 90 MG TABS tablet Take 1 tablet by mouth 2 times daily 60 tablet 3    cefdinir (OMNICEF) 300 MG capsule Take 300 mg by mouth 2 times daily      guaiFENesin (MUCINEX) 600 MG extended release tablet Take 600 mg by mouth 2 times daily       No current facility-administered medications for this visit. Results for orders placed during the hospital encounter of 11/15/12   X-ray chest PA and lateral    Narrative X-RAY A CHEST, 2 VIEWS       CLINICAL HISTORY Cough and short of breath     COMPARISONS None available. FINDINGS  Normal cardiac silhouette. There are atherosclerotic  calcifications in the aortic arch. There is coarsening of interstitial  markings in the mid and lung bases, most pronounced in lung bases. No  consolidating infiltrate. No pleural effusion or pneumothorax. The bony  thorax is unremarkable. IMPRESSION COARSENED INTERSTITIAL MARKINGS. NO CONSOLIDATING INFILTRATE. Micah Henry By- Anjali Hoyt M.D. Released By- Anjali Hoyt M.D. Released Date Time- 11/15/12 1119   This document has been electronically signed. ------------------------------------------------------------------------------       Assessment/Plan:     1. SOB (shortness of breath)  C/o shortness of breath , worse with exertion, carrying stuff and climbing stairs. He has no recent CXR. PFT show severely impaired diffusion capacity.  PFT reviewed with patient  Last CxR coarsen interstitial marking, ? Pulmonary  Fibrosis, check repeat CXR and if needed HRCT orf the chest.    2. Hypoxia  Start on 2 lit O2 with sleep and activity. He has ambulatory pulse oxymetry show low O2 86%. He is having short of breath with exertion mostly getting stopped    3. Coronary artery disease involving native coronary artery of native heart without angina pectoris  Patient had stent placed in 3/18 by dr. Bar Lose       No Follow-up on file.       Missy Mederos MD

## 2018-07-13 ENCOUNTER — HOSPITAL ENCOUNTER (OUTPATIENT)
Dept: CARDIAC REHAB | Age: 83
Setting detail: THERAPIES SERIES
Discharge: HOME | End: 2018-07-13
Payer: MEDICARE

## 2018-07-13 PROCEDURE — 93798 PHYS/QHP OP CAR RHAB W/ECG: CPT

## 2018-07-16 ENCOUNTER — HOSPITAL ENCOUNTER (OUTPATIENT)
Dept: CARDIAC REHAB | Age: 83
Setting detail: THERAPIES SERIES
Discharge: HOME | End: 2018-07-16
Payer: MEDICARE

## 2018-07-16 PROCEDURE — 93798 PHYS/QHP OP CAR RHAB W/ECG: CPT

## 2018-07-18 ENCOUNTER — HOSPITAL ENCOUNTER (OUTPATIENT)
Dept: CARDIAC REHAB | Age: 83
Setting detail: THERAPIES SERIES
Discharge: HOME | End: 2018-07-18
Payer: MEDICARE

## 2018-07-18 PROCEDURE — 93798 PHYS/QHP OP CAR RHAB W/ECG: CPT

## 2018-07-23 ENCOUNTER — HOSPITAL ENCOUNTER (OUTPATIENT)
Dept: CARDIAC REHAB | Age: 83
Setting detail: THERAPIES SERIES
Discharge: HOME | End: 2018-07-23
Payer: MEDICARE

## 2018-07-23 PROCEDURE — 93798 PHYS/QHP OP CAR RHAB W/ECG: CPT

## 2018-07-25 ENCOUNTER — HOSPITAL ENCOUNTER (OUTPATIENT)
Dept: CARDIAC REHAB | Age: 83
Setting detail: THERAPIES SERIES
Discharge: HOME | End: 2018-07-25
Payer: MEDICARE

## 2018-07-25 ENCOUNTER — OFFICE VISIT (OUTPATIENT)
Dept: PULMONOLOGY | Age: 83
End: 2018-07-25
Payer: MEDICARE

## 2018-07-25 VITALS
DIASTOLIC BLOOD PRESSURE: 72 MMHG | OXYGEN SATURATION: 99 % | HEIGHT: 72 IN | BODY MASS INDEX: 23.3 KG/M2 | TEMPERATURE: 97.7 F | WEIGHT: 172 LBS | SYSTOLIC BLOOD PRESSURE: 120 MMHG | HEART RATE: 85 BPM | RESPIRATION RATE: 16 BRPM

## 2018-07-25 DIAGNOSIS — R09.02 HYPOXIA: ICD-10-CM

## 2018-07-25 DIAGNOSIS — J44.9 CHRONIC OBSTRUCTIVE PULMONARY DISEASE, UNSPECIFIED COPD TYPE (HCC): Primary | ICD-10-CM

## 2018-07-25 DIAGNOSIS — R06.00 DYSPNEA, UNSPECIFIED TYPE: ICD-10-CM

## 2018-07-25 PROCEDURE — 99214 OFFICE O/P EST MOD 30 MIN: CPT | Performed by: INTERNAL MEDICINE

## 2018-07-25 PROCEDURE — 3023F SPIROM DOC REV: CPT | Performed by: INTERNAL MEDICINE

## 2018-07-25 PROCEDURE — 1101F PT FALLS ASSESS-DOCD LE1/YR: CPT | Performed by: INTERNAL MEDICINE

## 2018-07-25 PROCEDURE — 93798 PHYS/QHP OP CAR RHAB W/ECG: CPT

## 2018-07-25 PROCEDURE — 4040F PNEUMOC VAC/ADMIN/RCVD: CPT | Performed by: INTERNAL MEDICINE

## 2018-07-25 PROCEDURE — 1123F ACP DISCUSS/DSCN MKR DOCD: CPT | Performed by: INTERNAL MEDICINE

## 2018-07-25 PROCEDURE — 1036F TOBACCO NON-USER: CPT | Performed by: INTERNAL MEDICINE

## 2018-07-25 PROCEDURE — G8926 SPIRO NO PERF OR DOC: HCPCS | Performed by: INTERNAL MEDICINE

## 2018-07-25 PROCEDURE — G8427 DOCREV CUR MEDS BY ELIG CLIN: HCPCS | Performed by: INTERNAL MEDICINE

## 2018-07-25 PROCEDURE — G8420 CALC BMI NORM PARAMETERS: HCPCS | Performed by: INTERNAL MEDICINE

## 2018-07-25 PROCEDURE — G8598 ASA/ANTIPLAT THER USED: HCPCS | Performed by: INTERNAL MEDICINE

## 2018-07-25 ASSESSMENT — ENCOUNTER SYMPTOMS
NAUSEA: 0
EYE ITCHING: 0
VOICE CHANGE: 0
DIARRHEA: 0
CHEST TIGHTNESS: 0
COUGH: 0
ABDOMINAL PAIN: 0
SORE THROAT: 0
VOMITING: 0
SHORTNESS OF BREATH: 1
WHEEZING: 0
RHINORRHEA: 0

## 2018-07-25 NOTE — PROGRESS NOTES
Subjective:     Indy Mejia is a 80 y.o. male who complains today of:     Chief Complaint   Patient presents with    Follow-up     f/u for Chest Xray results. HPI  CXR done 7/11/18  C/o shortness of breath with exertion. No Wheezing   Cough with  Clear Sputum in morning   No Hemoptysis  No Chest tightness   No Chest pain with radiation  or pleuritic pain  No Fever or chills. No Rhinorrhea and postnasal drip. Patient is not on any inhaler, he was started on O2 but he Is not using it. He has pulse oxymetry at home and O2 sat goes down to 88 or below with ambulation. Allergies:  Patient has no known allergies. Past Medical History:   Diagnosis Date    Arthritis     CAD (coronary artery disease)     Hyperlipidemia     Hypertension      History reviewed. No pertinent surgical history. Family History   Problem Relation Age of Onset    Cancer Father     Stroke Father      Social History     Social History    Marital status:      Spouse name: N/A    Number of children: N/A    Years of education: N/A     Occupational History    Not on file. Social History Main Topics    Smoking status: Former Smoker    Smokeless tobacco: Never Used      Comment: quit smoking 1981    Alcohol use No    Drug use: No    Sexual activity: Not Currently     Partners: Female     Other Topics Concern    Not on file     Social History Narrative    No narrative on file         Review of Systems   Constitutional: Negative for chills, diaphoresis, fatigue and fever. HENT: Negative for congestion, mouth sores, nosebleeds, postnasal drip, rhinorrhea, sneezing, sore throat and voice change. Eyes: Negative for itching and visual disturbance. Respiratory: Positive for shortness of breath. Negative for cough, chest tightness and wheezing. Cardiovascular: Negative. Negative for chest pain, palpitations and leg swelling.    Gastrointestinal: Negative for abdominal pain, diarrhea, nausea and extended release tablet Take 600 mg by mouth 2 times daily      aspirin 81 MG chewable tablet Take 81 mg by mouth daily      nitroGLYCERIN (NITROSTAT) 0.4 MG SL tablet up to max of 3 total doses. If no relief after 1 dose, call 911. 25 tablet 3    amLODIPine (NORVASC) 5 MG tablet Take 5 mg by mouth daily      benazepril (LOTENSIN) 5 MG tablet Take 10 mg by mouth daily       cetirizine (ZYRTEC) 5 MG tablet Take 5 mg by mouth daily      lovastatin (MEVACOR) 40 MG tablet Take 40 mg by mouth nightly      metoprolol succinate (TOPROL XL) 25 MG extended release tablet Take 25 mg by mouth daily      Multiple Vitamins-Minerals (PRESERVISION AREDS 2) CAPS Take 1 tablet by mouth daily      ticagrelor (BRILINTA) 90 MG TABS tablet Take 1 tablet by mouth 2 times daily 60 tablet 3     No current facility-administered medications for this visit. PULMONARY FUNCTION     PATIENT NAME: Lauri Christy                     :        1935  MED REC NO:   [id number]                            ROOM:  ACCOUNT NO:   [de-identified]                           ADMIT DATE: 2018  PROVIDER:     Gilmer Campo MD     DATE OF PROCEDURE:  2018     PFTs were done on this 80year-old gentleman who is 6 feet tall, weighs 170  pounds with 27-year smoking history, quit 37 years ago, presenting with  dyspnea and cough.     Spirometry showed a forced vital capacity of 3.96 L which is 93% of  predicted. FEV1 was 2.53 L which is 84% of predicted with FEV1/FVC ratio  being reduced to 64%. FEF 25-75% was reduced to 1.33 L per second which is  66% of predicted. No improvement noted after bronchodilator therapy. MVV  was within normal limits.     Lung volumes done by body plethysmography showed normal total lung capacity  of 6.99 L which is 93% of predicted. Residual volume was 3.18 L which is  112% of predicted. RV/TLC ratio was normal at 45%.      Diffusion capacity was severely reduced to 8.58 which is 39% of predicted.     Airway resistance was normal.  Specific airway conductance was slightly  reduced.     OVERALL IMPRESSION:  This study shows evidence of mild obstructive  ventilatory impairment without reversibility associated with severe  diffusion impairment. COPD with emphysema versus other causes of severe  diffusion abnormality should be considered. Clinical correlation is  needed.        Nayeli Oneal MD  Results for orders placed during the hospital encounter of 07/11/18   XR CHEST STANDARD (2 VW)    Narrative Chest X-ray, 2 views    Clinical information:  Shortness of breath    Comparison:  November 15, 2012     Findings     Osseous structures intact. Cardiopericardial silhouette is normal. Pulmonary vasculature is normal. Lucent area projects cephalad and posterior to cardiac silhouette. Mild flattening the diaphragms and increased lung volumes. Stranding left lung base. Ill-defined area increased opacity right lung base. Impression     Bibasilar scarring versus subsegmental atelectasis/pneumonia. Emphysema           Assessment/Plan:     1. Chronic obstructive pulmonary disease, unspecified COPD type ([address]  Patient is having exertional shortness of breath , PFT show COPD and decreased DLCO. Start on Anoro ellipta 1 puff daily. CXR show emphysema , bibasilar scarring     2. Hypoxia  He has hypoxia with ambulation , recommend to use O2 with sleep and activity. Keep Spo2 92% or above. 3. Dyspnea, unspecified type  Patient is having  Chronic shortness of which is likely due to hypoxia and copd related. continue  Bronchodilator, O2 as requested. Sol Gutierrez keep  Spo2 92% or above. Return in about 4 months (around 11/25/2018) for hypoxia on O2, COPD.       Kalen Mcdonald MD

## 2018-07-30 ENCOUNTER — HOSPITAL ENCOUNTER (OUTPATIENT)
Dept: CARDIAC REHAB | Age: 83
Setting detail: THERAPIES SERIES
Discharge: HOME | End: 2018-07-30
Payer: MEDICARE

## 2018-07-30 PROCEDURE — 93798 PHYS/QHP OP CAR RHAB W/ECG: CPT

## 2018-08-01 ENCOUNTER — HOSPITAL ENCOUNTER (OUTPATIENT)
Dept: CARDIAC REHAB | Age: 83
Setting detail: THERAPIES SERIES
Discharge: HOME | End: 2018-08-01
Payer: MEDICARE

## 2018-08-01 PROCEDURE — 93798 PHYS/QHP OP CAR RHAB W/ECG: CPT

## 2018-08-02 ENCOUNTER — HOSPITAL ENCOUNTER (OUTPATIENT)
Dept: LAB | Age: 83
Discharge: HOME | End: 2018-08-02
Payer: MEDICARE

## 2018-08-02 LAB
ANION GAP SERPL CALCULATED.3IONS-SCNC: 14 MEQ/L (ref 7–13)
BUN BLDV-MCNC: 31 MG/DL (ref 8–23)
CALCIUM SERPL-MCNC: 9.3 MG/DL (ref 8.6–10.2)
CHLORIDE BLD-SCNC: 104 MEQ/L (ref 98–107)
CO2: 23 MEQ/L (ref 22–29)
CREAT SERPL-MCNC: 1.46 MG/DL (ref 0.7–1.2)
GFR AFRICAN AMERICAN: 55.8
GFR NON-AFRICAN AMERICAN: 46.1
GLUCOSE BLD-MCNC: 81 MG/DL (ref 74–109)
POTASSIUM SERPL-SCNC: 5.1 MEQ/L (ref 3.5–5.1)
SODIUM BLD-SCNC: 141 MEQ/L (ref 132–144)

## 2018-08-02 PROCEDURE — 36415 COLL VENOUS BLD VENIPUNCTURE: CPT

## 2018-08-02 PROCEDURE — 80048 BASIC METABOLIC PNL TOTAL CA: CPT

## 2018-08-06 ENCOUNTER — HOSPITAL ENCOUNTER (OUTPATIENT)
Dept: CARDIAC REHAB | Age: 83
Setting detail: THERAPIES SERIES
Discharge: HOME | End: 2018-08-06
Payer: MEDICARE

## 2018-08-06 PROCEDURE — 93798 PHYS/QHP OP CAR RHAB W/ECG: CPT

## 2018-08-08 ENCOUNTER — HOSPITAL ENCOUNTER (OUTPATIENT)
Dept: CARDIAC REHAB | Age: 83
Setting detail: THERAPIES SERIES
Discharge: HOME | End: 2018-08-08
Payer: MEDICARE

## 2018-08-08 PROCEDURE — 93798 PHYS/QHP OP CAR RHAB W/ECG: CPT

## 2018-08-13 ENCOUNTER — HOSPITAL ENCOUNTER (OUTPATIENT)
Dept: CARDIAC REHAB | Age: 83
Setting detail: THERAPIES SERIES
Discharge: HOME | End: 2018-08-13
Payer: MEDICARE

## 2018-08-13 PROCEDURE — 93798 PHYS/QHP OP CAR RHAB W/ECG: CPT

## 2018-09-13 ENCOUNTER — HOSPITAL ENCOUNTER (OUTPATIENT)
Dept: LAB | Age: 83
Discharge: HOME | End: 2018-09-13
Payer: MEDICARE

## 2018-09-13 LAB
ANION GAP SERPL CALCULATED.3IONS-SCNC: 18 MEQ/L (ref 7–13)
BUN BLDV-MCNC: 19 MG/DL (ref 8–23)
CALCIUM SERPL-MCNC: 9.4 MG/DL (ref 8.6–10.2)
CHLORIDE BLD-SCNC: 105 MEQ/L (ref 98–107)
CO2: 20 MEQ/L (ref 22–29)
CREAT SERPL-MCNC: 1.36 MG/DL (ref 0.7–1.2)
GFR AFRICAN AMERICAN: >60
GFR NON-AFRICAN AMERICAN: 50
GLUCOSE BLD-MCNC: 90 MG/DL (ref 74–109)
POTASSIUM SERPL-SCNC: 4.6 MEQ/L (ref 3.5–5.1)
SODIUM BLD-SCNC: 143 MEQ/L (ref 132–144)

## 2018-09-13 PROCEDURE — 36415 COLL VENOUS BLD VENIPUNCTURE: CPT

## 2018-09-13 PROCEDURE — 80048 BASIC METABOLIC PNL TOTAL CA: CPT

## 2018-11-20 ENCOUNTER — OFFICE VISIT (OUTPATIENT)
Dept: PULMONOLOGY | Age: 83
End: 2018-11-20
Payer: MEDICARE

## 2018-11-20 VITALS
DIASTOLIC BLOOD PRESSURE: 78 MMHG | RESPIRATION RATE: 16 BRPM | SYSTOLIC BLOOD PRESSURE: 132 MMHG | HEART RATE: 77 BPM | OXYGEN SATURATION: 94 % | BODY MASS INDEX: 24.24 KG/M2 | WEIGHT: 179 LBS | HEIGHT: 72 IN | TEMPERATURE: 97.4 F

## 2018-11-20 DIAGNOSIS — R06.02 SOB (SHORTNESS OF BREATH): ICD-10-CM

## 2018-11-20 DIAGNOSIS — R09.02 HYPOXIA: ICD-10-CM

## 2018-11-20 DIAGNOSIS — J44.9 CHRONIC OBSTRUCTIVE PULMONARY DISEASE, UNSPECIFIED COPD TYPE (HCC): Primary | ICD-10-CM

## 2018-11-20 PROCEDURE — 1101F PT FALLS ASSESS-DOCD LE1/YR: CPT | Performed by: INTERNAL MEDICINE

## 2018-11-20 PROCEDURE — G8926 SPIRO NO PERF OR DOC: HCPCS | Performed by: INTERNAL MEDICINE

## 2018-11-20 PROCEDURE — G8598 ASA/ANTIPLAT THER USED: HCPCS | Performed by: INTERNAL MEDICINE

## 2018-11-20 PROCEDURE — 3023F SPIROM DOC REV: CPT | Performed by: INTERNAL MEDICINE

## 2018-11-20 PROCEDURE — G8484 FLU IMMUNIZE NO ADMIN: HCPCS | Performed by: INTERNAL MEDICINE

## 2018-11-20 PROCEDURE — 1036F TOBACCO NON-USER: CPT | Performed by: INTERNAL MEDICINE

## 2018-11-20 PROCEDURE — G8420 CALC BMI NORM PARAMETERS: HCPCS | Performed by: INTERNAL MEDICINE

## 2018-11-20 PROCEDURE — 4040F PNEUMOC VAC/ADMIN/RCVD: CPT | Performed by: INTERNAL MEDICINE

## 2018-11-20 PROCEDURE — 1123F ACP DISCUSS/DSCN MKR DOCD: CPT | Performed by: INTERNAL MEDICINE

## 2018-11-20 PROCEDURE — 99214 OFFICE O/P EST MOD 30 MIN: CPT | Performed by: INTERNAL MEDICINE

## 2018-11-20 PROCEDURE — G8427 DOCREV CUR MEDS BY ELIG CLIN: HCPCS | Performed by: INTERNAL MEDICINE

## 2018-11-20 RX ORDER — DILTIAZEM HYDROCHLORIDE 120 MG/1
120 CAPSULE, EXTENDED RELEASE ORAL DAILY
Refills: 0 | COMMUNITY
Start: 2018-11-10 | End: 2020-02-05 | Stop reason: DRUGHIGH

## 2018-11-20 RX ORDER — ATORVASTATIN CALCIUM 40 MG/1
40 TABLET, FILM COATED ORAL DAILY
Refills: 0 | COMMUNITY
Start: 2018-11-17

## 2018-11-20 ASSESSMENT — ENCOUNTER SYMPTOMS
SORE THROAT: 0
COUGH: 1
ABDOMINAL PAIN: 0
WHEEZING: 0
SHORTNESS OF BREATH: 1
VOICE CHANGE: 0
DIARRHEA: 0
EYE ITCHING: 0
CHEST TIGHTNESS: 0
NAUSEA: 0
RHINORRHEA: 0
VOMITING: 0

## 2019-01-04 ENCOUNTER — HOSPITAL ENCOUNTER (OUTPATIENT)
Dept: LAB | Age: 84
Discharge: HOME | End: 2019-01-04
Payer: MEDICARE

## 2019-01-04 LAB
ANION GAP SERPL CALCULATED.3IONS-SCNC: 12 MEQ/L (ref 7–13)
BUN BLDV-MCNC: 23 MG/DL (ref 8–23)
CALCIUM SERPL-MCNC: 9.3 MG/DL (ref 8.6–10.2)
CHLORIDE BLD-SCNC: 105 MEQ/L (ref 98–107)
CO2: 25 MEQ/L (ref 22–29)
CREAT SERPL-MCNC: 1.5 MG/DL (ref 0.7–1.2)
GFR AFRICAN AMERICAN: 54
GFR NON-AFRICAN AMERICAN: 44.6
GLUCOSE BLD-MCNC: 92 MG/DL (ref 74–109)
POTASSIUM SERPL-SCNC: 4.6 MEQ/L (ref 3.5–5.1)
SODIUM BLD-SCNC: 142 MEQ/L (ref 132–144)

## 2019-01-04 PROCEDURE — 36415 COLL VENOUS BLD VENIPUNCTURE: CPT

## 2019-01-04 PROCEDURE — 80048 BASIC METABOLIC PNL TOTAL CA: CPT

## 2019-03-07 ENCOUNTER — HOSPITAL ENCOUNTER (OUTPATIENT)
Dept: LAB | Age: 84
Discharge: HOME | End: 2019-03-07
Payer: MEDICARE

## 2019-03-07 LAB
ANION GAP SERPL CALCULATED.3IONS-SCNC: 13 MEQ/L (ref 9–15)
BUN BLDV-MCNC: 24 MG/DL (ref 8–23)
CALCIUM SERPL-MCNC: 9.4 MG/DL (ref 8.5–9.9)
CHLORIDE BLD-SCNC: 105 MEQ/L (ref 95–107)
CHOLESTEROL, TOTAL: 117 MG/DL (ref 0–199)
CO2: 22 MEQ/L (ref 20–31)
CREAT SERPL-MCNC: 1.23 MG/DL (ref 0.7–1.2)
GFR AFRICAN AMERICAN: >60
GFR NON-AFRICAN AMERICAN: 56.1
GLUCOSE BLD-MCNC: 87 MG/DL (ref 70–99)
HDLC SERPL-MCNC: 39 MG/DL (ref 40–59)
LDL CHOLESTEROL CALCULATED: 62 MG/DL (ref 0–129)
POTASSIUM SERPL-SCNC: 4.7 MEQ/L (ref 3.4–4.9)
SODIUM BLD-SCNC: 140 MEQ/L (ref 135–144)
TRIGL SERPL-MCNC: 82 MG/DL (ref 0–150)
TSH SERPL DL<=0.05 MIU/L-ACNC: 3.93 UIU/ML (ref 0.44–3.86)

## 2019-03-07 PROCEDURE — 36415 COLL VENOUS BLD VENIPUNCTURE: CPT

## 2019-03-07 PROCEDURE — 80061 LIPID PANEL: CPT

## 2019-03-07 PROCEDURE — 80048 BASIC METABOLIC PNL TOTAL CA: CPT

## 2019-03-07 PROCEDURE — 84443 ASSAY THYROID STIM HORMONE: CPT

## 2019-03-26 ENCOUNTER — OFFICE VISIT (OUTPATIENT)
Dept: PULMONOLOGY | Age: 84
End: 2019-03-26
Payer: MEDICARE

## 2019-03-26 VITALS
HEART RATE: 91 BPM | TEMPERATURE: 95.5 F | BODY MASS INDEX: 23.43 KG/M2 | DIASTOLIC BLOOD PRESSURE: 62 MMHG | HEIGHT: 72 IN | OXYGEN SATURATION: 97 % | RESPIRATION RATE: 16 BRPM | WEIGHT: 173 LBS | SYSTOLIC BLOOD PRESSURE: 124 MMHG

## 2019-03-26 DIAGNOSIS — R94.2 ABNORMAL PFT: ICD-10-CM

## 2019-03-26 DIAGNOSIS — R06.02 SOB (SHORTNESS OF BREATH): ICD-10-CM

## 2019-03-26 DIAGNOSIS — R09.02 HYPOXIA: ICD-10-CM

## 2019-03-26 DIAGNOSIS — J44.9 CHRONIC OBSTRUCTIVE PULMONARY DISEASE, UNSPECIFIED COPD TYPE (HCC): Primary | ICD-10-CM

## 2019-03-26 PROCEDURE — 4040F PNEUMOC VAC/ADMIN/RCVD: CPT | Performed by: INTERNAL MEDICINE

## 2019-03-26 PROCEDURE — 1123F ACP DISCUSS/DSCN MKR DOCD: CPT | Performed by: INTERNAL MEDICINE

## 2019-03-26 PROCEDURE — G8926 SPIRO NO PERF OR DOC: HCPCS | Performed by: INTERNAL MEDICINE

## 2019-03-26 PROCEDURE — 99214 OFFICE O/P EST MOD 30 MIN: CPT | Performed by: INTERNAL MEDICINE

## 2019-03-26 PROCEDURE — 3023F SPIROM DOC REV: CPT | Performed by: INTERNAL MEDICINE

## 2019-03-26 PROCEDURE — G8420 CALC BMI NORM PARAMETERS: HCPCS | Performed by: INTERNAL MEDICINE

## 2019-03-26 PROCEDURE — G8484 FLU IMMUNIZE NO ADMIN: HCPCS | Performed by: INTERNAL MEDICINE

## 2019-03-26 PROCEDURE — G8598 ASA/ANTIPLAT THER USED: HCPCS | Performed by: INTERNAL MEDICINE

## 2019-03-26 PROCEDURE — G8427 DOCREV CUR MEDS BY ELIG CLIN: HCPCS | Performed by: INTERNAL MEDICINE

## 2019-03-26 PROCEDURE — 1036F TOBACCO NON-USER: CPT | Performed by: INTERNAL MEDICINE

## 2019-03-26 ASSESSMENT — ENCOUNTER SYMPTOMS
SORE THROAT: 0
ABDOMINAL PAIN: 0
NAUSEA: 0
VOICE CHANGE: 0
CHEST TIGHTNESS: 0
VOMITING: 0
RHINORRHEA: 0
SHORTNESS OF BREATH: 1
COUGH: 0
WHEEZING: 0
EYE ITCHING: 0
DIARRHEA: 0

## 2019-04-02 ENCOUNTER — HOSPITAL ENCOUNTER (OUTPATIENT)
Dept: LAB | Age: 84
Discharge: HOME | End: 2019-04-02
Payer: MEDICARE

## 2019-04-02 LAB — TSH SERPL DL<=0.05 MIU/L-ACNC: 2.77 UIU/ML (ref 0.44–3.86)

## 2019-04-02 PROCEDURE — 84443 ASSAY THYROID STIM HORMONE: CPT

## 2019-04-02 PROCEDURE — 36415 COLL VENOUS BLD VENIPUNCTURE: CPT

## 2019-04-26 ENCOUNTER — HOSPITAL ENCOUNTER (OUTPATIENT)
Dept: LAB | Age: 84
Discharge: HOME | End: 2019-04-26
Payer: MEDICARE

## 2019-04-26 LAB
ANION GAP SERPL CALCULATED.3IONS-SCNC: 16 MEQ/L (ref 9–15)
BUN BLDV-MCNC: 30 MG/DL (ref 8–23)
CALCIUM SERPL-MCNC: 9.2 MG/DL (ref 8.5–9.9)
CHLORIDE BLD-SCNC: 106 MEQ/L (ref 95–107)
CO2: 22 MEQ/L (ref 20–31)
CREAT SERPL-MCNC: 1.43 MG/DL (ref 0.7–1.2)
GFR AFRICAN AMERICAN: 57
GFR NON-AFRICAN AMERICAN: 47.1
GLUCOSE BLD-MCNC: 90 MG/DL (ref 70–99)
HCT VFR BLD CALC: 39.6 % (ref 42–52)
HEMOGLOBIN: 13.4 G/DL (ref 14–18)
MCH RBC QN AUTO: 32.1 PG (ref 27–31.3)
MCHC RBC AUTO-ENTMCNC: 33.8 % (ref 33–37)
MCV RBC AUTO: 94.9 FL (ref 80–100)
PDW BLD-RTO: 15.3 % (ref 11.5–14.5)
PLATELET # BLD: 208 K/UL (ref 130–400)
POTASSIUM SERPL-SCNC: 4.6 MEQ/L (ref 3.4–4.9)
RBC # BLD: 4.17 M/UL (ref 4.7–6.1)
SODIUM BLD-SCNC: 144 MEQ/L (ref 135–144)
WBC # BLD: 6.7 K/UL (ref 4.8–10.8)

## 2019-04-26 PROCEDURE — 85027 COMPLETE CBC AUTOMATED: CPT

## 2019-04-26 PROCEDURE — 36415 COLL VENOUS BLD VENIPUNCTURE: CPT

## 2019-04-26 PROCEDURE — 80048 BASIC METABOLIC PNL TOTAL CA: CPT

## 2019-04-29 ENCOUNTER — HOSPITAL ENCOUNTER (OUTPATIENT)
Dept: CARDIAC CATH/INVASIVE PROCEDURES | Age: 84
Discharge: HOME | End: 2019-04-29
Attending: INTERNAL MEDICINE | Admitting: INTERNAL MEDICINE
Payer: MEDICARE

## 2019-04-29 VITALS — OXYGEN SATURATION: 95 % | RESPIRATION RATE: 12 BRPM | HEART RATE: 66 BPM

## 2019-04-29 DIAGNOSIS — I25.10 CORONARY ARTERY DISEASE INVOLVING NATIVE CORONARY ARTERY, ANGINA PRESENCE UNSPECIFIED, UNSPECIFIED WHETHER NATIVE OR TRANSPLANTED HEART: Primary | ICD-10-CM

## 2019-04-29 LAB
ANION GAP SERPL CALCULATED.3IONS-SCNC: 15 MEQ/L (ref 9–15)
BUN BLDV-MCNC: 24 MG/DL (ref 8–23)
CALCIUM SERPL-MCNC: 9.3 MG/DL (ref 8.5–9.9)
CHLORIDE BLD-SCNC: 106 MEQ/L (ref 95–107)
CO2: 21 MEQ/L (ref 20–31)
CREAT SERPL-MCNC: 1.17 MG/DL (ref 0.7–1.2)
EKG ATRIAL RATE: 67 BPM
EKG P AXIS: 56 DEGREES
EKG P-R INTERVAL: 170 MS
EKG Q-T INTERVAL: 416 MS
EKG QRS DURATION: 122 MS
EKG QTC CALCULATION (BAZETT): 439 MS
EKG R AXIS: -38 DEGREES
EKG T AXIS: 8 DEGREES
EKG VENTRICULAR RATE: 67 BPM
GFR AFRICAN AMERICAN: >60
GFR NON-AFRICAN AMERICAN: 59.4
GLUCOSE BLD-MCNC: 92 MG/DL (ref 70–99)
POTASSIUM SERPL-SCNC: 4.6 MEQ/L (ref 3.4–4.9)
SODIUM BLD-SCNC: 142 MEQ/L (ref 135–144)

## 2019-04-29 PROCEDURE — 2709999900 HC NON-CHARGEABLE SUPPLY

## 2019-04-29 PROCEDURE — C1887 CATHETER, GUIDING: HCPCS

## 2019-04-29 PROCEDURE — C9600 PERC DRUG-EL COR STENT SING: HCPCS | Performed by: INTERNAL MEDICINE

## 2019-04-29 PROCEDURE — 93458 L HRT ARTERY/VENTRICLE ANGIO: CPT | Performed by: INTERNAL MEDICINE

## 2019-04-29 PROCEDURE — 85347 COAGULATION TIME ACTIVATED: CPT

## 2019-04-29 PROCEDURE — 6360000002 HC RX W HCPCS

## 2019-04-29 PROCEDURE — C1760 CLOSURE DEV, VASC: HCPCS

## 2019-04-29 PROCEDURE — 2580000003 HC RX 258

## 2019-04-29 PROCEDURE — 2500000003 HC RX 250 WO HCPCS

## 2019-04-29 PROCEDURE — C1769 GUIDE WIRE: HCPCS

## 2019-04-29 PROCEDURE — 2580000003 HC RX 258: Performed by: INTERNAL MEDICINE

## 2019-04-29 PROCEDURE — C1874 STENT, COATED/COV W/DEL SYS: HCPCS

## 2019-04-29 PROCEDURE — 93005 ELECTROCARDIOGRAM TRACING: CPT

## 2019-04-29 PROCEDURE — C1894 INTRO/SHEATH, NON-LASER: HCPCS

## 2019-04-29 PROCEDURE — 80048 BASIC METABOLIC PNL TOTAL CA: CPT

## 2019-04-29 PROCEDURE — C1725 CATH, TRANSLUMIN NON-LASER: HCPCS

## 2019-04-29 RX ORDER — ONDANSETRON 2 MG/ML
4 INJECTION INTRAMUSCULAR; INTRAVENOUS EVERY 6 HOURS PRN
Status: DISCONTINUED | OUTPATIENT
Start: 2019-04-29 | End: 2019-04-29 | Stop reason: HOSPADM

## 2019-04-29 RX ORDER — SODIUM CHLORIDE 0.9 % (FLUSH) 0.9 %
10 SYRINGE (ML) INJECTION PRN
Status: DISCONTINUED | OUTPATIENT
Start: 2019-04-29 | End: 2019-04-29 | Stop reason: HOSPADM

## 2019-04-29 RX ORDER — DOCUSATE SODIUM 100 MG/1
100 CAPSULE, LIQUID FILLED ORAL 2 TIMES DAILY
Status: DISCONTINUED | OUTPATIENT
Start: 2019-04-29 | End: 2019-04-29 | Stop reason: HOSPADM

## 2019-04-29 RX ORDER — HYDRALAZINE HYDROCHLORIDE 20 MG/ML
10 INJECTION INTRAMUSCULAR; INTRAVENOUS EVERY 10 MIN PRN
Status: DISCONTINUED | OUTPATIENT
Start: 2019-04-29 | End: 2019-04-29 | Stop reason: HOSPADM

## 2019-04-29 RX ORDER — SODIUM CHLORIDE 0.9 % (FLUSH) 0.9 %
10 SYRINGE (ML) INJECTION EVERY 12 HOURS SCHEDULED
Status: DISCONTINUED | OUTPATIENT
Start: 2019-04-29 | End: 2019-04-29 | Stop reason: HOSPADM

## 2019-04-29 RX ORDER — 0.9 % SODIUM CHLORIDE 0.9 %
500 INTRAVENOUS SOLUTION INTRAVENOUS ONCE
Status: DISCONTINUED | OUTPATIENT
Start: 2019-04-29 | End: 2019-04-29 | Stop reason: HOSPADM

## 2019-04-29 RX ORDER — ATROPINE SULFATE 0.4 MG/ML
0.6 AMPUL (ML) INJECTION
Status: DISCONTINUED | OUTPATIENT
Start: 2019-04-29 | End: 2019-04-29 | Stop reason: HOSPADM

## 2019-04-29 RX ORDER — ASPIRIN 81 MG/1
81 TABLET ORAL DAILY
Status: DISCONTINUED | OUTPATIENT
Start: 2019-04-30 | End: 2019-04-29 | Stop reason: HOSPADM

## 2019-04-29 RX ORDER — SODIUM CHLORIDE 9 MG/ML
INJECTION, SOLUTION INTRAVENOUS CONTINUOUS
Status: DISCONTINUED | OUTPATIENT
Start: 2019-04-29 | End: 2019-04-29 | Stop reason: HOSPADM

## 2019-04-29 RX ORDER — ACETAMINOPHEN 325 MG/1
650 TABLET ORAL EVERY 4 HOURS PRN
Status: DISCONTINUED | OUTPATIENT
Start: 2019-04-29 | End: 2019-04-29 | Stop reason: HOSPADM

## 2019-04-29 RX ADMIN — SODIUM CHLORIDE: 9 INJECTION, SOLUTION INTRAVENOUS at 09:14

## 2019-04-29 NOTE — OP NOTE
INDICATIONS:  The patient is a 80 y.o. male  with history of coronary artery disease, previous stenting of the LAD, significant pulmonary issues, now presents for cardiac catheterization and intervention of the right coronary artery. Multiple discussions have been held about the pros and cons, patient and wife wish to proceed. PROCEDURE:  Left heart cath, coronary angiography, left ventriculography and selective right common femoral angiography was performed. PCI and stenting of the proximal mid and proximal distal segments of the right coronary artery was performed. Benefits, alternatives and risks of the procedure were explained to the patient to include but not limited to MI, Stroke, Death, Allergic reaction to dye, bleeding, risk of kidney injury, and possible need for emergent coronary artery bypass grafting and informed consent was obtained. The patient was brought to the cath lab and was prepped and draped in the normal sterile technique. IV conscious sedation was maintained. 1% lidocaine was used for local anesthesia. DESCRIPTION OF PROCEDURE: Under local anesthesia, a 5-Chadian short sheath was  placed in the right common femoral artery by single anterior percutaneous stick. Selective right common femoral angiography showed that the access was in the right common femoral artery above its bifurcation. A 5-Chadian JL4 diagnostic catheter was advanced over a 0.035 guidewire and the left main coronary artery was selectively engaged. Angiography of the left system was performed in multiple orthogonal views. The 5-Chadian JL4 diagnostic catheter and the guidewire were removed. A 5-Chadian 3DRC diagnostic catheter was advanced over a 0.035 guidewire but  the right coronary artery could not be selectively engaged. The 3 [facility] diagnostic catheter was removed. A 5 Western Renée AR mod diagnostic catheter was advanced, and the right coronary artery was selectively engaged.   Angiography of the right coronary artery was performed in multiple orthogonal views. The 5-[place]izen [place] ARmod  diagnostic catheter and guidewire were removed. A 5-[place]izen [place] angled pigtail catheter was advanced over a 0.035 guidewire across the aortic valve into the left ventricle. Left ventricular end-diastolic pressure was measured. Left ventriculography was performed in about 30° AGUAYO view. Slow manual pullback was performed across the aortic valve. At the conclusion of the procedure, it was decided to proceed with PCI and stenting of the Right Coronary  artery. The existing 5 [place] right femoral artery sheath was changed to a 6 Western Renée short sheath. Heparin was administered and ACT was documented. A 6 [place] AR1 guide catheter was advanced over a 0.035 guidewire and the Right  coronary artery was selectively engaged. A 0.014 180cm Run through wire was  and the lesion was crossed without difficulty. Attempt was made to advance a 2.5 x 15 mm balloon, however the guidewire and the guide catheter prolapsed. The guidewire the guide catheter and the balloon was removed. A 6 [place] AL 1 guide catheter was then advanced over 0.035 guidewire and the right coronary artery was selectively engaged. 0.014 , 180 cm run-through wire was readvanced into the distal right coronary artery. A 2.5 x 15 mm mm Abbott Trek balloon was advanced over the wire and the lesion was dilated to 15 shirley. The balloon catheter was removed. A 3.5 x 28 mm mm Xience  Alpine drug-eluting stent was advanced into the segment of disease in the distal right coronary artery and deployed at 12  shirley. The stent balloon was removed. 4.0 x 28 mm Xience Laurence drug-eluting stent was advanced with difficulty, and deployed proximal to the first stent with minimal overlap, at 12 shirley. The stent balloon was removed. A 4.0 x 38 mm resolute Livingston drug-eluting stent was advanced, and deployed proximal to the 4.0 x 28 mm stent with minimal overlap, at 17 shirley.   A 4.0 x 8 mm noncompliant trek balloon was advanced, and the stents were post dilated up to 19 shirley. Balloon was removed. Final angiographic results showed  20% residual in the distal mid segment, 0% residual stenosis elsewhere,  JUAN ALBERTO 3 flow and no dissection. There were no immediate complications. The right femoral artery sheath was removed and hemostasis was achieved using Angio-Seal hemostatic device. HEMODYNAMIC / ANGIOGRAPHIC DATA:    1. Left ventricular end diastolic pressure was 10 mmHg. No systolic gradient across the aortic valve. 2. Left ventriculography revealed an EF around 60%. 3. The left main coronary artery  has 20% distal tapering. .  4. The left anterior descending artery is a large vessel that curves around the left ventricular apex. Radiopaque stent is noted in the proximal and mid left anterior descending artery. Proximal LAD has 20-30% stenosis. Mid LAD has 20-30% smooth stenosis proximal to the stented segment. Distal LAD has 10% stenosis  5. First diagonal branch measures about 2.25 mm and has less than 10% stenosis that can diagonal branch measures about 2.25 mm and has less than 10% stenosis third diagonal branch measures about 2.5 mm and has less than 10% stenosis. Vicky January 6. The left circumflex is nondominant, has about 30% proximal stenosis, first obtuse marginal branch is large, has 40% mid stenosis, subsequently divides into 2 secondary branches. Second and terminal obtuse marginal branch is small with less than 10% stenosis,large atrial branch arises from the left circumflex artery and is normal.  7. The right coronary artery is large, dominant, tortuous, with 75-80% tubular segment of disease in the mid mid, distal mid and proximal distal RCA. Distal segment of proximal RCA has areas of 50-60% stenosis, hazy appearance and aneurysmal segments in between.   8. Successful PCI/stenting of the proximal segment of distal RCA, mid RCA, and distal segment of proximal RCA was performed, with reduction of stenosis from 75-80 % to 0%. 9. 3.5 x 28 mm Xience Laurence drug-eluting stents, 4.0 x 28 mm Xience Laurence drug-eluting stent and 4.0 x 38 mm resolute Shane drug-eluting stents were used in minimally overlapping fashion. Stents were post dilated with a 4.0 noncompliant balloon. 10. There was JUAN ALBERTO-3 flow before and after the procedure. RECOMMENDATIONS:  Post-procedure care will focus on prevention of any ischemic events and congestive complications. Aggressive risk factor modification and dual antiplatelet therapy are recommended.      Yaritza Alvarez MD

## 2019-04-29 NOTE — PROGRESS NOTES
Patient taking fluids at this time. Dr. Celestino Guzman reviewed results with patient and family. Right groin slight ooze. Will continue to monitor.   Patient voided 300 of clear yellow urine

## 2019-04-29 NOTE — PROGRESS NOTES
Pressure held to right groin for 10 min and new dressing and sandbag applied.   Will continue to monitor

## 2019-04-29 NOTE — CONSULTS
Pt seen post-cath to discuss admission to cardiac rehab. He has been in the program in the past and is familiar with benefits etc. Will contact next week to set up appt here if stable.

## 2019-05-01 ENCOUNTER — HOSPITAL ENCOUNTER (OUTPATIENT)
Dept: LAB | Age: 84
Discharge: HOME | End: 2019-05-01
Payer: MEDICARE

## 2019-05-01 LAB
ANION GAP SERPL CALCULATED.3IONS-SCNC: 13 MEQ/L (ref 9–15)
BUN BLDV-MCNC: 25 MG/DL (ref 8–23)
CALCIUM SERPL-MCNC: 9.3 MG/DL (ref 8.5–9.9)
CHLORIDE BLD-SCNC: 104 MEQ/L (ref 95–107)
CO2: 23 MEQ/L (ref 20–31)
CREAT SERPL-MCNC: 1.24 MG/DL (ref 0.7–1.2)
GFR AFRICAN AMERICAN: >60
GFR NON-AFRICAN AMERICAN: 55.5
GLUCOSE BLD-MCNC: 91 MG/DL (ref 70–99)
POTASSIUM SERPL-SCNC: 4.8 MEQ/L (ref 3.4–4.9)
SODIUM BLD-SCNC: 140 MEQ/L (ref 135–144)

## 2019-05-01 PROCEDURE — 80048 BASIC METABOLIC PNL TOTAL CA: CPT

## 2019-05-01 PROCEDURE — 36415 COLL VENOUS BLD VENIPUNCTURE: CPT

## 2019-05-02 LAB
PERFORMED ON: ABNORMAL
PERFORMED ON: ABNORMAL
POC ACTIVATED CLOTTING TIME KAOLIN: 235 SEC (ref 82–152)
POC ACTIVATED CLOTTING TIME KAOLIN: 257 SEC (ref 82–152)
POC SAMPLE TYPE: ABNORMAL
POC SAMPLE TYPE: ABNORMAL

## 2019-05-15 ENCOUNTER — HOSPITAL ENCOUNTER (OUTPATIENT)
Dept: CARDIAC REHAB | Age: 84
Setting detail: THERAPIES SERIES
Discharge: HOME | End: 2019-05-15
Payer: MEDICARE

## 2019-05-15 PROCEDURE — 93798 PHYS/QHP OP CAR RHAB W/ECG: CPT

## 2019-05-17 ENCOUNTER — HOSPITAL ENCOUNTER (OUTPATIENT)
Dept: CARDIAC REHAB | Age: 84
Setting detail: THERAPIES SERIES
Discharge: HOME | End: 2019-05-17
Payer: MEDICARE

## 2019-05-17 PROCEDURE — 93798 PHYS/QHP OP CAR RHAB W/ECG: CPT

## 2019-05-22 ENCOUNTER — HOSPITAL ENCOUNTER (OUTPATIENT)
Dept: CARDIAC REHAB | Age: 84
Setting detail: THERAPIES SERIES
Discharge: HOME | End: 2019-05-22
Payer: MEDICARE

## 2019-05-22 PROCEDURE — 93798 PHYS/QHP OP CAR RHAB W/ECG: CPT

## 2019-05-24 ENCOUNTER — HOSPITAL ENCOUNTER (OUTPATIENT)
Dept: CARDIAC REHAB | Age: 84
Setting detail: THERAPIES SERIES
Discharge: HOME | End: 2019-05-24
Payer: MEDICARE

## 2019-05-24 PROCEDURE — 93798 PHYS/QHP OP CAR RHAB W/ECG: CPT

## 2019-05-29 ENCOUNTER — HOSPITAL ENCOUNTER (OUTPATIENT)
Dept: CARDIAC REHAB | Age: 84
Setting detail: THERAPIES SERIES
Discharge: HOME | End: 2019-05-29
Payer: MEDICARE

## 2019-05-29 PROCEDURE — 93798 PHYS/QHP OP CAR RHAB W/ECG: CPT

## 2019-05-31 ENCOUNTER — HOSPITAL ENCOUNTER (OUTPATIENT)
Dept: CARDIAC REHAB | Age: 84
Setting detail: THERAPIES SERIES
Discharge: HOME | End: 2019-05-31
Payer: MEDICARE

## 2019-05-31 PROCEDURE — 93798 PHYS/QHP OP CAR RHAB W/ECG: CPT

## 2019-06-04 ENCOUNTER — HOSPITAL ENCOUNTER (OUTPATIENT)
Dept: CARDIAC REHAB | Age: 84
Setting detail: THERAPIES SERIES
Discharge: HOME | End: 2019-06-04
Payer: MEDICARE

## 2019-06-04 PROCEDURE — 93798 PHYS/QHP OP CAR RHAB W/ECG: CPT

## 2019-06-07 ENCOUNTER — HOSPITAL ENCOUNTER (OUTPATIENT)
Dept: CARDIAC REHAB | Age: 84
Setting detail: THERAPIES SERIES
Discharge: HOME | End: 2019-06-07
Payer: MEDICARE

## 2019-06-07 PROCEDURE — 93798 PHYS/QHP OP CAR RHAB W/ECG: CPT

## 2019-06-11 ENCOUNTER — HOSPITAL ENCOUNTER (OUTPATIENT)
Dept: CARDIAC REHAB | Age: 84
Setting detail: THERAPIES SERIES
Discharge: HOME | End: 2019-06-11
Payer: MEDICARE

## 2019-06-11 PROCEDURE — 93798 PHYS/QHP OP CAR RHAB W/ECG: CPT

## 2019-06-13 ENCOUNTER — HOSPITAL ENCOUNTER (OUTPATIENT)
Dept: CT IMAGING | Age: 84
Discharge: HOME | End: 2019-06-15
Payer: MEDICARE

## 2019-06-13 DIAGNOSIS — J44.9 CHRONIC OBSTRUCTIVE PULMONARY DISEASE, UNSPECIFIED COPD TYPE (HCC): ICD-10-CM

## 2019-06-13 DIAGNOSIS — R06.02 BREATH SHORTNESS: ICD-10-CM

## 2019-06-13 PROCEDURE — 71250 CT THORAX DX C-: CPT

## 2019-06-14 ENCOUNTER — HOSPITAL ENCOUNTER (OUTPATIENT)
Dept: CARDIAC REHAB | Age: 84
Setting detail: THERAPIES SERIES
Discharge: HOME | End: 2019-06-14
Payer: MEDICARE

## 2019-06-14 PROCEDURE — 93798 PHYS/QHP OP CAR RHAB W/ECG: CPT

## 2019-06-19 ENCOUNTER — HOSPITAL ENCOUNTER (OUTPATIENT)
Dept: CARDIAC REHAB | Age: 84
Setting detail: THERAPIES SERIES
Discharge: HOME | End: 2019-06-19
Payer: MEDICARE

## 2019-06-19 PROCEDURE — 93798 PHYS/QHP OP CAR RHAB W/ECG: CPT

## 2019-06-21 ENCOUNTER — HOSPITAL ENCOUNTER (OUTPATIENT)
Dept: CARDIAC REHAB | Age: 84
Setting detail: THERAPIES SERIES
Discharge: HOME | End: 2019-06-21
Payer: MEDICARE

## 2019-06-21 PROCEDURE — 93798 PHYS/QHP OP CAR RHAB W/ECG: CPT

## 2019-06-26 ENCOUNTER — HOSPITAL ENCOUNTER (OUTPATIENT)
Dept: CARDIAC REHAB | Age: 84
Setting detail: THERAPIES SERIES
Discharge: HOME | End: 2019-06-26
Payer: MEDICARE

## 2019-06-26 PROCEDURE — 93798 PHYS/QHP OP CAR RHAB W/ECG: CPT

## 2019-06-28 ENCOUNTER — HOSPITAL ENCOUNTER (OUTPATIENT)
Dept: CARDIAC REHAB | Age: 84
Setting detail: THERAPIES SERIES
Discharge: HOME | End: 2019-06-28
Payer: MEDICARE

## 2019-06-28 PROCEDURE — 93798 PHYS/QHP OP CAR RHAB W/ECG: CPT

## 2019-07-05 ENCOUNTER — HOSPITAL ENCOUNTER (OUTPATIENT)
Dept: CARDIAC REHAB | Age: 84
Setting detail: THERAPIES SERIES
Discharge: HOME | End: 2019-07-05
Payer: MEDICARE

## 2019-07-05 PROCEDURE — 93798 PHYS/QHP OP CAR RHAB W/ECG: CPT

## 2019-07-10 ENCOUNTER — HOSPITAL ENCOUNTER (OUTPATIENT)
Dept: CARDIAC REHAB | Age: 84
Setting detail: THERAPIES SERIES
Discharge: HOME | End: 2019-07-10
Payer: MEDICARE

## 2019-07-10 PROCEDURE — 93798 PHYS/QHP OP CAR RHAB W/ECG: CPT

## 2019-07-17 ENCOUNTER — HOSPITAL ENCOUNTER (OUTPATIENT)
Dept: CARDIAC REHAB | Age: 84
Setting detail: THERAPIES SERIES
Discharge: HOME | End: 2019-07-17
Payer: MEDICARE

## 2019-07-17 PROCEDURE — 93798 PHYS/QHP OP CAR RHAB W/ECG: CPT

## 2019-07-26 ENCOUNTER — HOSPITAL ENCOUNTER (OUTPATIENT)
Dept: CARDIAC REHAB | Age: 84
Setting detail: THERAPIES SERIES
Discharge: HOME | End: 2019-07-26
Payer: MEDICARE

## 2019-07-26 PROCEDURE — 93798 PHYS/QHP OP CAR RHAB W/ECG: CPT

## 2019-07-31 ENCOUNTER — HOSPITAL ENCOUNTER (OUTPATIENT)
Dept: CARDIAC REHAB | Age: 84
Setting detail: THERAPIES SERIES
Discharge: HOME | End: 2019-07-31
Payer: MEDICARE

## 2019-07-31 PROCEDURE — 93798 PHYS/QHP OP CAR RHAB W/ECG: CPT

## 2019-08-05 ENCOUNTER — HOSPITAL ENCOUNTER (OUTPATIENT)
Dept: CARDIAC REHAB | Age: 84
Setting detail: THERAPIES SERIES
Discharge: HOME | End: 2019-08-05
Payer: MEDICARE

## 2019-08-05 PROCEDURE — 93798 PHYS/QHP OP CAR RHAB W/ECG: CPT

## 2019-08-09 ENCOUNTER — HOSPITAL ENCOUNTER (OUTPATIENT)
Dept: CARDIAC REHAB | Age: 84
Setting detail: THERAPIES SERIES
Discharge: HOME | End: 2019-08-09
Payer: MEDICARE

## 2019-08-09 PROCEDURE — 93798 PHYS/QHP OP CAR RHAB W/ECG: CPT

## 2019-08-16 ENCOUNTER — HOSPITAL ENCOUNTER (OUTPATIENT)
Dept: CARDIAC REHAB | Age: 84
Setting detail: THERAPIES SERIES
Discharge: HOME | End: 2019-08-16
Payer: MEDICARE

## 2019-08-16 PROCEDURE — 93798 PHYS/QHP OP CAR RHAB W/ECG: CPT

## 2019-08-21 ENCOUNTER — HOSPITAL ENCOUNTER (OUTPATIENT)
Dept: CARDIAC REHAB | Age: 84
Setting detail: THERAPIES SERIES
Discharge: HOME | End: 2019-08-21
Payer: MEDICARE

## 2019-08-21 PROCEDURE — 93798 PHYS/QHP OP CAR RHAB W/ECG: CPT

## 2019-08-28 ENCOUNTER — HOSPITAL ENCOUNTER (OUTPATIENT)
Dept: CARDIAC REHAB | Age: 84
Setting detail: THERAPIES SERIES
Discharge: HOME | End: 2019-08-28
Payer: MEDICARE

## 2019-08-28 PROCEDURE — 93798 PHYS/QHP OP CAR RHAB W/ECG: CPT

## 2019-08-30 ENCOUNTER — HOSPITAL ENCOUNTER (OUTPATIENT)
Dept: CARDIAC REHAB | Age: 84
Setting detail: THERAPIES SERIES
Discharge: HOME | End: 2019-08-30
Payer: MEDICARE

## 2019-08-30 PROCEDURE — 93798 PHYS/QHP OP CAR RHAB W/ECG: CPT

## 2019-09-06 ENCOUNTER — HOSPITAL ENCOUNTER (OUTPATIENT)
Dept: CARDIAC REHAB | Age: 84
Setting detail: THERAPIES SERIES
Discharge: HOME | End: 2019-09-06
Payer: MEDICARE

## 2019-09-06 PROCEDURE — 93798 PHYS/QHP OP CAR RHAB W/ECG: CPT

## 2019-09-13 ENCOUNTER — HOSPITAL ENCOUNTER (OUTPATIENT)
Dept: CARDIAC REHAB | Age: 84
Setting detail: THERAPIES SERIES
Discharge: HOME | End: 2019-09-13
Payer: MEDICARE

## 2019-09-13 PROCEDURE — 93798 PHYS/QHP OP CAR RHAB W/ECG: CPT

## 2019-09-16 ENCOUNTER — HOSPITAL ENCOUNTER (OUTPATIENT)
Dept: CARDIAC REHAB | Age: 84
Setting detail: THERAPIES SERIES
Discharge: HOME | End: 2019-09-16
Payer: MEDICARE

## 2019-09-16 PROCEDURE — 93798 PHYS/QHP OP CAR RHAB W/ECG: CPT

## 2019-09-20 ENCOUNTER — HOSPITAL ENCOUNTER (OUTPATIENT)
Dept: CARDIAC REHAB | Age: 84
Setting detail: THERAPIES SERIES
Discharge: HOME | End: 2019-09-20
Payer: MEDICARE

## 2019-09-20 PROCEDURE — 93798 PHYS/QHP OP CAR RHAB W/ECG: CPT

## 2019-09-23 ENCOUNTER — HOSPITAL ENCOUNTER (OUTPATIENT)
Dept: CARDIAC REHAB | Age: 84
Setting detail: THERAPIES SERIES
Discharge: HOME | End: 2019-09-23
Payer: MEDICARE

## 2019-09-23 PROCEDURE — 93798 PHYS/QHP OP CAR RHAB W/ECG: CPT

## 2019-09-27 ENCOUNTER — HOSPITAL ENCOUNTER (OUTPATIENT)
Dept: CARDIAC REHAB | Age: 84
Setting detail: THERAPIES SERIES
Discharge: HOME | End: 2019-09-27
Payer: MEDICARE

## 2019-09-27 PROCEDURE — 93798 PHYS/QHP OP CAR RHAB W/ECG: CPT

## 2019-09-30 ENCOUNTER — HOSPITAL ENCOUNTER (OUTPATIENT)
Dept: CARDIAC REHAB | Age: 84
Setting detail: THERAPIES SERIES
Discharge: HOME | End: 2019-09-30
Payer: MEDICARE

## 2019-09-30 PROCEDURE — 93798 PHYS/QHP OP CAR RHAB W/ECG: CPT

## 2019-10-03 ENCOUNTER — OFFICE VISIT (OUTPATIENT)
Dept: PULMONOLOGY | Age: 84
End: 2019-10-03
Payer: MEDICARE

## 2019-10-03 VITALS
BODY MASS INDEX: 23.98 KG/M2 | HEIGHT: 72 IN | HEART RATE: 81 BPM | WEIGHT: 177 LBS | DIASTOLIC BLOOD PRESSURE: 70 MMHG | RESPIRATION RATE: 16 BRPM | SYSTOLIC BLOOD PRESSURE: 116 MMHG | TEMPERATURE: 96.7 F | OXYGEN SATURATION: 95 %

## 2019-10-03 DIAGNOSIS — R06.02 SHORTNESS OF BREATH: ICD-10-CM

## 2019-10-03 DIAGNOSIS — J84.10 PULMONARY FIBROSIS (HCC): ICD-10-CM

## 2019-10-03 DIAGNOSIS — R09.02 HYPOXIA: ICD-10-CM

## 2019-10-03 DIAGNOSIS — J44.9 CHRONIC OBSTRUCTIVE PULMONARY DISEASE, UNSPECIFIED COPD TYPE (HCC): Primary | ICD-10-CM

## 2019-10-03 PROCEDURE — 4040F PNEUMOC VAC/ADMIN/RCVD: CPT | Performed by: INTERNAL MEDICINE

## 2019-10-03 PROCEDURE — G8427 DOCREV CUR MEDS BY ELIG CLIN: HCPCS | Performed by: INTERNAL MEDICINE

## 2019-10-03 PROCEDURE — 99214 OFFICE O/P EST MOD 30 MIN: CPT | Performed by: INTERNAL MEDICINE

## 2019-10-03 PROCEDURE — 1123F ACP DISCUSS/DSCN MKR DOCD: CPT | Performed by: INTERNAL MEDICINE

## 2019-10-03 PROCEDURE — G8484 FLU IMMUNIZE NO ADMIN: HCPCS | Performed by: INTERNAL MEDICINE

## 2019-10-03 PROCEDURE — G8926 SPIRO NO PERF OR DOC: HCPCS | Performed by: INTERNAL MEDICINE

## 2019-10-03 PROCEDURE — 3023F SPIROM DOC REV: CPT | Performed by: INTERNAL MEDICINE

## 2019-10-03 PROCEDURE — G8598 ASA/ANTIPLAT THER USED: HCPCS | Performed by: INTERNAL MEDICINE

## 2019-10-03 PROCEDURE — 1036F TOBACCO NON-USER: CPT | Performed by: INTERNAL MEDICINE

## 2019-10-03 PROCEDURE — G8420 CALC BMI NORM PARAMETERS: HCPCS | Performed by: INTERNAL MEDICINE

## 2019-10-03 RX ORDER — CLOPIDOGREL BISULFATE 75 MG/1
TABLET ORAL
COMMUNITY
Start: 2019-09-28

## 2019-10-04 ENCOUNTER — HOSPITAL ENCOUNTER (OUTPATIENT)
Dept: CARDIAC REHAB | Age: 84
Setting detail: THERAPIES SERIES
Discharge: HOME | End: 2019-10-04
Payer: MEDICARE

## 2019-10-04 PROCEDURE — 93798 PHYS/QHP OP CAR RHAB W/ECG: CPT

## 2019-10-06 ASSESSMENT — ENCOUNTER SYMPTOMS
WHEEZING: 0
SHORTNESS OF BREATH: 1
NAUSEA: 0
VOMITING: 0
SORE THROAT: 0
RHINORRHEA: 0
COUGH: 0
ABDOMINAL PAIN: 0
EYE ITCHING: 0
CHEST TIGHTNESS: 0
DIARRHEA: 0
VOICE CHANGE: 0

## 2019-10-07 ENCOUNTER — HOSPITAL ENCOUNTER (OUTPATIENT)
Dept: CARDIAC REHAB | Age: 84
Setting detail: THERAPIES SERIES
Discharge: HOME | End: 2019-10-07
Payer: MEDICARE

## 2019-10-07 PROCEDURE — 93798 PHYS/QHP OP CAR RHAB W/ECG: CPT

## 2019-10-14 ENCOUNTER — HOSPITAL ENCOUNTER (OUTPATIENT)
Dept: CARDIAC REHAB | Age: 84
Setting detail: THERAPIES SERIES
Discharge: HOME | End: 2019-10-14
Payer: MEDICARE

## 2019-10-14 PROCEDURE — 93798 PHYS/QHP OP CAR RHAB W/ECG: CPT

## 2020-02-05 ENCOUNTER — OFFICE VISIT (OUTPATIENT)
Dept: PULMONOLOGY | Age: 85
End: 2020-02-05
Payer: MEDICARE

## 2020-02-05 VITALS
HEIGHT: 72 IN | SYSTOLIC BLOOD PRESSURE: 120 MMHG | BODY MASS INDEX: 23.84 KG/M2 | WEIGHT: 176 LBS | OXYGEN SATURATION: 92 % | HEART RATE: 86 BPM | DIASTOLIC BLOOD PRESSURE: 74 MMHG | TEMPERATURE: 98.2 F | RESPIRATION RATE: 16 BRPM

## 2020-02-05 PROBLEM — Z77.090 ASBESTOS EXPOSURE: Status: ACTIVE | Noted: 2020-02-05

## 2020-02-05 PROCEDURE — G8427 DOCREV CUR MEDS BY ELIG CLIN: HCPCS | Performed by: INTERNAL MEDICINE

## 2020-02-05 PROCEDURE — 1036F TOBACCO NON-USER: CPT | Performed by: INTERNAL MEDICINE

## 2020-02-05 PROCEDURE — 4040F PNEUMOC VAC/ADMIN/RCVD: CPT | Performed by: INTERNAL MEDICINE

## 2020-02-05 PROCEDURE — 99214 OFFICE O/P EST MOD 30 MIN: CPT | Performed by: INTERNAL MEDICINE

## 2020-02-05 PROCEDURE — 3023F SPIROM DOC REV: CPT | Performed by: INTERNAL MEDICINE

## 2020-02-05 PROCEDURE — G8420 CALC BMI NORM PARAMETERS: HCPCS | Performed by: INTERNAL MEDICINE

## 2020-02-05 PROCEDURE — G8484 FLU IMMUNIZE NO ADMIN: HCPCS | Performed by: INTERNAL MEDICINE

## 2020-02-05 PROCEDURE — 1123F ACP DISCUSS/DSCN MKR DOCD: CPT | Performed by: INTERNAL MEDICINE

## 2020-02-05 PROCEDURE — G8926 SPIRO NO PERF OR DOC: HCPCS | Performed by: INTERNAL MEDICINE

## 2020-02-05 RX ORDER — DILTIAZEM HYDROCHLORIDE 180 MG/1
CAPSULE, COATED, EXTENDED RELEASE ORAL
Status: ON HOLD | COMMUNITY
Start: 2019-12-27 | End: 2021-05-05

## 2020-02-05 ASSESSMENT — ENCOUNTER SYMPTOMS
VOICE CHANGE: 0
DIARRHEA: 0
VOMITING: 0
EYE ITCHING: 0
SORE THROAT: 0
ABDOMINAL PAIN: 0
SHORTNESS OF BREATH: 1
COUGH: 1
NAUSEA: 0
RHINORRHEA: 1
CHEST TIGHTNESS: 0
WHEEZING: 0

## 2020-07-20 ENCOUNTER — OFFICE VISIT (OUTPATIENT)
Dept: PULMONOLOGY | Age: 85
End: 2020-07-20
Payer: MEDICARE

## 2020-07-20 VITALS
OXYGEN SATURATION: 94 % | HEIGHT: 72 IN | DIASTOLIC BLOOD PRESSURE: 70 MMHG | TEMPERATURE: 97.3 F | SYSTOLIC BLOOD PRESSURE: 120 MMHG | RESPIRATION RATE: 16 BRPM | WEIGHT: 171 LBS | BODY MASS INDEX: 23.16 KG/M2 | HEART RATE: 78 BPM

## 2020-07-20 PROCEDURE — G8420 CALC BMI NORM PARAMETERS: HCPCS | Performed by: INTERNAL MEDICINE

## 2020-07-20 PROCEDURE — 4040F PNEUMOC VAC/ADMIN/RCVD: CPT | Performed by: INTERNAL MEDICINE

## 2020-07-20 PROCEDURE — G8427 DOCREV CUR MEDS BY ELIG CLIN: HCPCS | Performed by: INTERNAL MEDICINE

## 2020-07-20 PROCEDURE — 1123F ACP DISCUSS/DSCN MKR DOCD: CPT | Performed by: INTERNAL MEDICINE

## 2020-07-20 PROCEDURE — 99214 OFFICE O/P EST MOD 30 MIN: CPT | Performed by: INTERNAL MEDICINE

## 2020-07-20 PROCEDURE — 1036F TOBACCO NON-USER: CPT | Performed by: INTERNAL MEDICINE

## 2020-07-20 ASSESSMENT — ENCOUNTER SYMPTOMS
VOICE CHANGE: 0
VOMITING: 0
COUGH: 1
SHORTNESS OF BREATH: 1
CHEST TIGHTNESS: 0
EYE ITCHING: 0
SORE THROAT: 0
ABDOMINAL PAIN: 0
WHEEZING: 0
NAUSEA: 0
DIARRHEA: 0
RHINORRHEA: 0

## 2020-07-20 NOTE — PROGRESS NOTES
Ct chest  Subjective:     Grace Anthony is a 80 y.o. male who complains today of:     Chief Complaint   Patient presents with    Follow-up     five month f/u for COPD. HPI  Patient is using O2  2 lit with sleep   C/o shortness of breath , worse with exertion, climbing stairs, . Occasional Wheezing   C/o  Cough with clear mucus in AM   No Hemoptysis  No Chest tightness   No Chest pain with radiation  or pleuritic pain  No Fever or chills. C/o Rhinorrhea and postnasal drip. Using bronchodilator with inhaler . Allergies:  Patient has no known allergies.   Past Medical History:   Diagnosis Date    Arthritis     CAD (coronary artery disease)     Hyperlipidemia     Hypertension     Lung disease      Past Surgical History:   Procedure Laterality Date    CAROTID STENT PLACEMENT       Family History   Problem Relation Age of Onset    Cancer Father     Stroke Father      Social History     Socioeconomic History    Marital status:      Spouse name: Not on file    Number of children: Not on file    Years of education: Not on file    Highest education level: Not on file   Occupational History    Not on file   Social Needs    Financial resource strain: Not on file    Food insecurity     Worry: Not on file     Inability: Not on file    Transportation needs     Medical: Not on file     Non-medical: Not on file   Tobacco Use    Smoking status: Former Smoker    Smokeless tobacco: Never Used    Tobacco comment: quit smoking 1981   Substance and Sexual Activity    Alcohol use: No    Drug use: No    Sexual activity: Not Currently     Partners: Female   Lifestyle    Physical activity     Days per week: Not on file     Minutes per session: Not on file    Stress: Not on file   Relationships    Social connections     Talks on phone: Not on file     Gets together: Not on file     Attends [employer] service: Not on file     Active member of club or organization: Not on file     Attends meetings of clubs or organizations: Not on file     Relationship status: Not on file    Intimate partner violence     Fear of current or ex partner: Not on file     Emotionally abused: Not on file     Physically abused: Not on file     Forced sexual activity: Not on file   Other Topics Concern    Not on file   Social History Narrative    Not on file         Review of Systems   Constitutional: Negative for chills, diaphoresis, fatigue and fever. HENT: Negative for congestion, mouth sores, nosebleeds, postnasal drip, rhinorrhea, sneezing, sore throat and voice change. Eyes: Negative for itching and visual disturbance. Respiratory: Positive for cough and shortness of breath. Negative for chest tightness and wheezing. Cardiovascular: Negative. Negative for chest pain, palpitations and leg swelling. Gastrointestinal: Negative for abdominal pain, diarrhea, nausea and vomiting. Genitourinary: Negative for difficulty urinating and hematuria. Musculoskeletal: Negative for arthralgias, joint swelling and myalgias. Skin: Negative for rash. Allergic/Immunologic: Negative for environmental allergies. Neurological: Negative for dizziness, tremors, weakness and headaches. Psychiatric/Behavioral: Negative for behavioral problems and sleep disturbance.         :     Vitals:    07/20/20 1256   BP: 120/70   Pulse: 78   Resp: 16   Temp: 97.3 °F (36.3 °C)   TempSrc: Temporal   SpO2: 94%   Weight: 171 lb (77.6 kg)   Height: 6' (1.829 m)     Wt Readings from Last 3 Encounters:   07/20/20 171 lb (77.6 kg)   02/05/20 176 lb (79.8 kg)   10/03/19 177 lb (80.3 kg)         Physical Exam  Constitutional:       Appearance: He is well-developed. HENT:      Head: Normocephalic and atraumatic. Nose: Nose normal.   Eyes:      Conjunctiva/sclera: Conjunctivae normal.      Pupils: Pupils are equal, round, and reactive to light. Neck:      Thyroid: No thyromegaly. Vascular: No JVD.       Trachea: No tracheal deviation. Cardiovascular:      Rate and Rhythm: Normal rate and regular rhythm. Heart sounds: No murmur. No friction rub. No gallop. Pulmonary:      Effort: Pulmonary effort is normal. No respiratory distress. Breath sounds: Rales (bibasilar ) present. No wheezing. Comments: diminished Breath sound bilaterally. Chest:      Chest wall: No tenderness. Abdominal:      General: There is no distension. Musculoskeletal: Normal range of motion. Lymphadenopathy:      Cervical: No cervical adenopathy. Skin:     General: Skin is warm and dry. Findings: No rash. Neurological:      Mental Status: He is alert and oriented to person, place, and time. Cranial Nerves: No cranial nerve deficit. Psychiatric:         Behavior: Behavior normal.         Current Outpatient Medications   Medication Sig Dispense Refill    diltiazem (CARDIZEM CD) 180 MG extended release capsule       clopidogrel (PLAVIX) 75 MG tablet       atorvastatin (LIPITOR) 40 MG tablet Take 40 mg by mouth daily   0    aspirin 81 MG chewable tablet Take 81 mg by mouth daily      nitroGLYCERIN (NITROSTAT) 0.4 MG SL tablet up to max of 3 total doses. If no relief after 1 dose, call 911. (Patient taking differently: Place 0.4 mg under the tongue every 5 minutes as needed up to max of 3 total doses. If no relief after 1 dose, call 911.) 25 tablet 3    cetirizine (ZYRTEC) 5 MG tablet Take 5 mg by mouth daily      Multiple Vitamins-Minerals (PRESERVISION AREDS 2) CAPS Take 1 tablet by mouth daily       No current facility-administered medications for this visit. Results for orders placed during the hospital encounter of 07/11/18   XR CHEST STANDARD (2 VW)    Narrative Chest X-ray, 2 views    Clinical information:  Shortness of breath    Comparison:  November 15, 2012     Findings     Osseous structures intact.  Cardiopericardial silhouette is normal. Pulmonary vasculature is normal. Lucent area projects cephalad and posterior to cardiac silhouette. Mild flattening the diaphragms and increased lung volumes. Stranding left lung base. Ill-defined area increased opacity right lung base. Impression     Bibasilar scarring versus subsegmental atelectasis/pneumonia. Emphysema         Assessment/Plan:     1. Pulmonary fibrosis ([address]  He  is using O2  2 lit with sleep  . C/o shortness of breath , worse with exertion, climbing stairs, . Occasional Wheezing . C/o  Cough with clear mucus in AM   C/o Rhinorrhea and postnasal drip. Currently is not using any bronchodilator therapy. Continue O2 as before. I will request PFT for further evaluation    - CT CHEST HIGH RESOLUTION; Future  - Full PFT Study With Bronchodilator; Future    2. Asbestosis ([address]  He has a history of asbestos exposure and associated pulmonary fibrosis of pleural plaque. I will request follow-up CT chest    - CT CHEST HIGH RESOLUTION; Future      Return in about 6 weeks (around 8/31/2020).       Zenobia Chao MD

## 2020-08-27 ENCOUNTER — HOSPITAL ENCOUNTER (OUTPATIENT)
Dept: PULMONOLOGY | Age: 85
Discharge: HOME | End: 2020-08-27
Payer: MEDICARE

## 2020-08-27 ENCOUNTER — HOSPITAL ENCOUNTER (OUTPATIENT)
Dept: CT IMAGING | Age: 85
Discharge: HOME | End: 2020-08-29
Payer: MEDICARE

## 2020-08-27 PROCEDURE — 94060 EVALUATION OF WHEEZING: CPT | Performed by: INTERNAL MEDICINE

## 2020-08-27 PROCEDURE — 94060 EVALUATION OF WHEEZING: CPT

## 2020-08-27 PROCEDURE — 94729 DIFFUSING CAPACITY: CPT

## 2020-08-27 PROCEDURE — 6360000002 HC RX W HCPCS: Performed by: INTERNAL MEDICINE

## 2020-08-27 PROCEDURE — 94726 PLETHYSMOGRAPHY LUNG VOLUMES: CPT

## 2020-08-27 PROCEDURE — 71250 CT THORAX DX C-: CPT

## 2020-08-27 PROCEDURE — 94729 DIFFUSING CAPACITY: CPT | Performed by: INTERNAL MEDICINE

## 2020-08-27 PROCEDURE — 94726 PLETHYSMOGRAPHY LUNG VOLUMES: CPT | Performed by: INTERNAL MEDICINE

## 2020-08-27 RX ORDER — ALBUTEROL SULFATE 2.5 MG/3ML
2.5 SOLUTION RESPIRATORY (INHALATION) ONCE
Status: COMPLETED | OUTPATIENT
Start: 2020-08-27 | End: 2020-08-27

## 2020-08-27 RX ADMIN — ALBUTEROL SULFATE 2.5 MG: 2.5 SOLUTION RESPIRATORY (INHALATION) at 12:21

## 2020-08-30 NOTE — PROCEDURES
[address]                      [address], [address] [facility]                               PULMONARY FUNCTION    PATIENT NAME: Navid Walter                     :        1935  MED REC NO:   [id number]                            ROOM:  ACCOUNT NO:   [de-identified]                           ADMIT DATE: 2020  PROVIDER:     Rosa Dobson MD    DATE OF PROCEDURE:  2020    REQUESTING PROVIDER:  Rosa Dobson MD    INTERPRETING PROVIDER:  Rosa Dobson MD    REASON FOR STUDY:  Shortness of breath, cough. INTERPRETATION:  FVC is 3.80, 91% of predicted. FEV1 is 2.31, 79% of  predicted. FEV1/FVC is 61%. FEF 25-75% is 1.14, 60% of predicted. Postbronchodilator study shows no improvement. Lung volume study shows  residual volume is 2.83, 98% of predicted. TLC is 6.50, 86% of  predicted. Diffusion capacity is 8.52, 40% of predicted. Airway  resistance is 1.16, 79% of predicted. SUMMARY:  Mild obstructive pulmonary disease. There is no significant  response to bronchodilator. Static lung volume within normal range. Diffusion capacity is severely impaired. Airway resistance is low. Flow volume loop suggests obstructive pulmonary disease. Clinical  correlation is requested.         Lu Brush MD    D: 2020 10:32:11       T: 2020 11:05:21     [email or web address]  Job#: [id number]     Doc#: [id number]    CC:

## 2020-08-31 ENCOUNTER — OFFICE VISIT (OUTPATIENT)
Dept: PULMONOLOGY | Age: 85
End: 2020-08-31
Payer: MEDICARE

## 2020-08-31 VITALS
HEIGHT: 72 IN | RESPIRATION RATE: 16 BRPM | WEIGHT: 170 LBS | SYSTOLIC BLOOD PRESSURE: 124 MMHG | DIASTOLIC BLOOD PRESSURE: 72 MMHG | BODY MASS INDEX: 23.03 KG/M2 | HEART RATE: 87 BPM | TEMPERATURE: 96.9 F | OXYGEN SATURATION: 97 %

## 2020-08-31 PROBLEM — J43.1 PANLOBULAR EMPHYSEMA (HCC): Status: ACTIVE | Noted: 2020-08-31

## 2020-08-31 PROCEDURE — 1036F TOBACCO NON-USER: CPT | Performed by: INTERNAL MEDICINE

## 2020-08-31 PROCEDURE — G8420 CALC BMI NORM PARAMETERS: HCPCS | Performed by: INTERNAL MEDICINE

## 2020-08-31 PROCEDURE — 3023F SPIROM DOC REV: CPT | Performed by: INTERNAL MEDICINE

## 2020-08-31 PROCEDURE — 1123F ACP DISCUSS/DSCN MKR DOCD: CPT | Performed by: INTERNAL MEDICINE

## 2020-08-31 PROCEDURE — 4040F PNEUMOC VAC/ADMIN/RCVD: CPT | Performed by: INTERNAL MEDICINE

## 2020-08-31 PROCEDURE — G8427 DOCREV CUR MEDS BY ELIG CLIN: HCPCS | Performed by: INTERNAL MEDICINE

## 2020-08-31 PROCEDURE — 99214 OFFICE O/P EST MOD 30 MIN: CPT | Performed by: INTERNAL MEDICINE

## 2020-08-31 PROCEDURE — G8926 SPIRO NO PERF OR DOC: HCPCS | Performed by: INTERNAL MEDICINE

## 2020-08-31 ASSESSMENT — ENCOUNTER SYMPTOMS
VOICE CHANGE: 0
VOMITING: 0
EYE ITCHING: 0
COUGH: 1
SHORTNESS OF BREATH: 1
SORE THROAT: 0
DIARRHEA: 0
WHEEZING: 0
ABDOMINAL PAIN: 0
NAUSEA: 0
RHINORRHEA: 0
CHEST TIGHTNESS: 0

## 2020-08-31 NOTE — PROGRESS NOTES
Active member of club or organization: Not on file     Attends meetings of clubs or organizations: Not on file     Relationship status: Not on file    Intimate partner violence     Fear of current or ex partner: Not on file     Emotionally abused: Not on file     Physically abused: Not on file     Forced sexual activity: Not on file   Other Topics Concern    Not on file   Social History Narrative    Not on file         Review of Systems   Constitutional: Negative for chills, diaphoresis, fatigue and fever. HENT: Negative for congestion, mouth sores, nosebleeds, postnasal drip, rhinorrhea, sneezing, sore throat and voice change. Eyes: Negative for itching and visual disturbance. Respiratory: Positive for cough and shortness of breath. Negative for chest tightness and wheezing. Cardiovascular: Negative. Negative for chest pain, palpitations and leg swelling. Gastrointestinal: Negative for abdominal pain, diarrhea, nausea and vomiting. Genitourinary: Negative for difficulty urinating and hematuria. Musculoskeletal: Negative for arthralgias, joint swelling and myalgias. Skin: Negative for rash. Allergic/Immunologic: Negative for environmental allergies. Neurological: Negative for dizziness, tremors, weakness and headaches. Psychiatric/Behavioral: Negative for behavioral problems and sleep disturbance.         :     Vitals:    08/31/20 1302   BP: 124/72   Pulse: 87   Resp: 16   Temp: 96.9 °F (36.1 °C)   TempSrc: Temporal   SpO2: 97%   Weight: 170 lb (77.1 kg)   Height: 6' (1.829 m)     Wt Readings from Last 3 Encounters:   08/31/20 170 lb (77.1 kg)   07/20/20 171 lb (77.6 kg)   02/05/20 176 lb (79.8 kg)         Physical Exam  Constitutional:       Appearance: He is well-developed. HENT:      Head: Normocephalic and atraumatic. Nose: Nose normal.   Eyes:      Conjunctiva/sclera: Conjunctivae normal.      Pupils: Pupils are equal, round, and reactive to light.    Neck:      Thyroid: No normal. Pulmonary vasculature is normal. Lucent area projects cephalad and posterior to cardiac silhouette. Mild flattening the diaphragms and increased lung volumes. Stranding left lung base. Ill-defined area increased opacity right lung base. Impression     Bibasilar scarring versus subsegmental atelectasis/pneumonia. Emphysema       ]     Severe emphysema.         Interstitial lung disease, most notable in dependent lower lobes bilaterally.         Saccular infrarenal abdominal aortic aneurysm measuring 1.7 x 1.5 cm.         Ectasia of ascending and descending thoracic aorta.         No CT evidence of asbestos-related disease.              All CT scans at this facility use dose modulation, iterative reconstruction, and/or weight based dosing when appropriate to reduce radiation dose to as low as reasonably achievable. [facility] [address]                      [facility], [address] [place] [address]                                PULMONARY FUNCTION     PATIENT NAME: Navid Walter                     :        1935  MED REC NO:   [id number]                            ROOM:  ACCOUNT NO:   [de-identified]                           ADMIT DATE: 2020  PROVIDER:     Rosa Dobson MD     DATE OF PROCEDURE:  2020     REQUESTING PROVIDER:  Rosa Dobson MD     INTERPRETING PROVIDER:  Rosa Dobson MD     REASON FOR STUDY:  Shortness of breath, cough.     INTERPRETATION:  FVC is 3.80, 91% of predicted. FEV1 is 2.31, 79% of  predicted. FEV1/FVC is 61%. FEF 25-75% is 1.14, 60% of predicted. Postbronchodilator study shows no improvement. Lung volume study shows  residual volume is 2.83, 98% of predicted. TLC is 6.50, 86% of  predicted. Diffusion capacity is 8.52, 40% of predicted. Airway  resistance is 1.16, 79% of predicted.     SUMMARY:  Mild obstructive pulmonary disease. There is no significant  response to bronchodilator. Static lung volume within normal range. Diffusion capacity is severely impaired. Airway resistance is low. Flow volume loop suggests obstructive pulmonary disease. Clinical  correlation is requested.           Devorah Galloway MD     D: 08/30/2020 10:32:11       T: 08/30/2020 11:05:21     [email or web address]  Job#: [id number]     Doc#: [id number]    Assessment/Plan:     1. Pulmonary fibrosis ([address]  He has CT chest showing interstitial lung disease in both basilar area. CT chest reviewed with patient    2. Hypoxia  On 2 L oxygen with sleep and PRN. He will continue same. Keep saturation 90% or above    3. Panlobular emphysema ([address]  He  had PFT done which shows mild obstructive pulmonary disease diffusion capacity is severely impaired flow volume loop suggests obstructive pulmonary disease  CT chest shows severe emphysema. CT chest reviewed with patient    4. Shortness of breath  Patient is having  Chronic shortness of breath which is likely due to emphysema and pulmonary fibrosis, continue  Bronchodilator, O2 as before. keep  Spo2 90% or above. Return in about 14 weeks (around 12/7/2020) for COPD, pulmonary fibrosis.       Sultana Hidalgo MD

## 2020-10-17 NOTE — PROGRESS NOTES
Adventism service: Not on file     Active member of club or organization: Not on file     Attends meetings of clubs or organizations: Not on file     Relationship status: Not on file    Intimate partner violence:     Fear of current or ex partner: Not on file     Emotionally abused: Not on file     Physically abused: Not on file     Forced sexual activity: Not on file   Other Topics Concern    Not on file   Social History Narrative    Not on file         Review of Systems   Constitutional: Negative for chills, diaphoresis, fatigue and fever. HENT: Positive for postnasal drip and rhinorrhea. Negative for congestion, mouth sores, nosebleeds, sneezing, sore throat and voice change. Eyes: Negative for itching and visual disturbance. Respiratory: Positive for cough and shortness of breath. Negative for chest tightness and wheezing. Cardiovascular: Negative. Negative for chest pain, palpitations and leg swelling. Gastrointestinal: Negative for abdominal pain, diarrhea, nausea and vomiting. Genitourinary: Negative for difficulty urinating and hematuria. Musculoskeletal: Negative for arthralgias, joint swelling and myalgias. Skin: Negative for rash. Allergic/Immunologic: Negative for environmental allergies. Neurological: Negative for dizziness, tremors, weakness and headaches. Psychiatric/Behavioral: Negative for behavioral problems and sleep disturbance.         :     Vitals:    02/05/20 1602   BP: 120/74   Pulse: 86   Resp: 16   Temp: 98.2 °F (36.8 °C)   TempSrc: Tympanic   SpO2: 92%   Weight: 176 lb (79.8 kg)   Height: 6' (1.829 m)     Wt Readings from Last 3 Encounters:   02/05/20 176 lb (79.8 kg)   10/03/19 177 lb (80.3 kg)   03/26/19 173 lb (78.5 kg)         Physical Exam  Constitutional:       Appearance: He is well-developed. HENT:      Head: Normocephalic and atraumatic.       Nose: Nose normal.   Eyes:      Conjunctiva/sclera: Conjunctivae normal.      Pupils: Pupils are equal, round, and reactive to light. Neck:      Thyroid: No thyromegaly. Vascular: No JVD. Trachea: No tracheal deviation. Cardiovascular:      Rate and Rhythm: Normal rate and regular rhythm. Heart sounds: No murmur. No friction rub. No gallop. Pulmonary:      Effort: Pulmonary effort is normal. No respiratory distress. Breath sounds: Normal breath sounds. No wheezing or rales. Comments: diminished Breath sound bilaterally. Chest:      Chest wall: No tenderness. Abdominal:      General: There is no distension. Musculoskeletal: Normal range of motion. Lymphadenopathy:      Cervical: No cervical adenopathy. Skin:     General: Skin is warm and dry. Findings: No rash. Neurological:      Mental Status: He is alert and oriented to person, place, and time. Cranial Nerves: No cranial nerve deficit. Psychiatric:         Behavior: Behavior normal.         Current Outpatient Medications   Medication Sig Dispense Refill    diltiazem (CARDIZEM CD) 180 MG extended release capsule       clopidogrel (PLAVIX) 75 MG tablet       atorvastatin (LIPITOR) 40 MG tablet Take 40 mg by mouth daily   0    aspirin 81 MG chewable tablet Take 81 mg by mouth daily      nitroGLYCERIN (NITROSTAT) 0.4 MG SL tablet up to max of 3 total doses. If no relief after 1 dose, call 911. (Patient taking differently: Place 0.4 mg under the tongue every 5 minutes as needed up to max of 3 total doses. If no relief after 1 dose, call 911.) 25 tablet 3    cetirizine (ZYRTEC) 5 MG tablet Take 5 mg by mouth daily      Multiple Vitamins-Minerals (PRESERVISION AREDS 2) CAPS Take 1 tablet by mouth daily       No current facility-administered medications for this visit.         Results for orders placed during the hospital encounter of 07/11/18   XR CHEST STANDARD (2 VW)    Narrative Chest X-ray, 2 views    Clinical information:  Shortness of breath    Comparison:  November 15, 2012     Findings     Osseous structures intact. Cardiopericardial silhouette is normal. Pulmonary vasculature is normal. Lucent area projects cephalad and posterior to cardiac silhouette. Mild flattening the diaphragms and increased lung volumes. Stranding left lung base. Ill-defined area increased opacity right lung base. Impression     Bibasilar scarring versus subsegmental atelectasis/pneumonia. Emphysema         Narrative   HISTORY: Jaylene Paige is a Male of 80 years age. DIAGNOSIS: J44.9 Chronic obstructive pulmonary disease, unspecified COPD type ([facility] [address] ICD10           COMPARISON: None available.       TECHNIQUE:    High resolution CT of the lungs was performed. 2-D reformatted axial, sagittal and coronal images were obtained. 3-D MIPS images were also performed.       FINDINGS:    Severe emphysematous disease is seen. Large bullae and blebs are seen throughout the lungs. Also upper lobe centrilobular emphysematous disease is seen. The lungs are hyperlucent. Subpleural reticulation and honeycombing are also visualized. Lower lobe    traction bronchiectasis is seen. There is mild pleural thickening.       Aneurysmal dilation of the ascending aorta is seen measuring 4 cm transverse and 4.2 cm AP. Atherosclerotic calcifications are visualized as well as coronary calcifications and/or stents. The visualized descending aorta is tortuous. . A slight    dextroscoliosis is seen in the thoracic spine. Prominent degenerative changes are seen at T2-3. Limited survey views of the upper abdomen show hypodensities in the visualized portion of the right kidney with the largest measuring 5 cm. 2.3 cm hypodensity    is seen in the renal pelvis of the visualized left kidney.           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 Pleural thickening is seen without evidence of calcified pleural plaques.  This could reflect asbestosis as well.       Aneurysmal dilation of the ascending aorta is seen.     Ears: no ear pain and no hearing problems. Nose: no nasal congestion and no nasal drainage. Mouth/Throat: no dysphagia, no hoarseness and no throat pain. Neck: no lumps, no pain, no stiffness and no swollen glands.

## 2021-05-05 ENCOUNTER — HOSPITAL ENCOUNTER (INPATIENT)
Age: 86
LOS: 3 days | Discharge: HOME HEALTH | DRG: 190 | End: 2021-05-08
Attending: EMERGENCY MEDICINE | Admitting: INTERNAL MEDICINE
Payer: MEDICARE

## 2021-05-05 ENCOUNTER — APPOINTMENT (OUTPATIENT)
Dept: GENERAL RADIOLOGY | Age: 86
DRG: 190 | End: 2021-05-05
Payer: MEDICARE

## 2021-05-05 DIAGNOSIS — J18.9 PNEUMONIA DUE TO ORGANISM: ICD-10-CM

## 2021-05-05 DIAGNOSIS — J44.1 COPD EXACERBATION (HCC): Primary | ICD-10-CM

## 2021-05-05 LAB
ALBUMIN SERPL-MCNC: 4.1 G/DL (ref 3.5–4.6)
ALP BLD-CCNC: 120 U/L (ref 35–104)
ALT SERPL-CCNC: 21 U/L (ref 0–41)
ANION GAP SERPL CALCULATED.3IONS-SCNC: 12 MEQ/L (ref 9–15)
AST SERPL-CCNC: 21 U/L (ref 0–40)
BASOPHILS ABSOLUTE: 0 K/UL (ref 0–0.2)
BASOPHILS RELATIVE PERCENT: 0.4 %
BILIRUB SERPL-MCNC: 0.5 MG/DL (ref 0.2–0.7)
BUN BLDV-MCNC: 19 MG/DL (ref 8–23)
CALCIUM SERPL-MCNC: 10.4 MG/DL (ref 8.5–9.9)
CHLORIDE BLD-SCNC: 105 MEQ/L (ref 95–107)
CO2: 25 MEQ/L (ref 20–31)
CREAT SERPL-MCNC: 1.32 MG/DL (ref 0.7–1.2)
EKG ATRIAL RATE: 84 BPM
EKG P AXIS: 46 DEGREES
EKG P-R INTERVAL: 160 MS
EKG Q-T INTERVAL: 364 MS
EKG QRS DURATION: 112 MS
EKG QTC CALCULATION (BAZETT): 430 MS
EKG R AXIS: -41 DEGREES
EKG T AXIS: 3 DEGREES
EKG VENTRICULAR RATE: 84 BPM
EOSINOPHILS ABSOLUTE: 0.3 K/UL (ref 0–0.7)
EOSINOPHILS RELATIVE PERCENT: 2.4 %
GFR AFRICAN AMERICAN: >60
GFR NON-AFRICAN AMERICAN: 51.4
GLOBULIN: 4.6 G/DL (ref 2.3–3.5)
GLUCOSE BLD-MCNC: 144 MG/DL (ref 70–99)
HCT VFR BLD CALC: 40.2 % (ref 42–52)
HEMOGLOBIN: 13.5 G/DL (ref 14–18)
INR BLD: 1.1
LACTIC ACID: 1.1 MMOL/L (ref 0.5–2.2)
LYMPHOCYTES ABSOLUTE: 0.8 K/UL (ref 1–4.8)
LYMPHOCYTES RELATIVE PERCENT: 7.2 %
MAGNESIUM: 2.1 MG/DL (ref 1.7–2.4)
MCH RBC QN AUTO: 29.9 PG (ref 27–31.3)
MCHC RBC AUTO-ENTMCNC: 33.6 % (ref 33–37)
MCV RBC AUTO: 89 FL (ref 80–100)
MONOCYTES ABSOLUTE: 0.8 K/UL (ref 0.2–0.8)
MONOCYTES RELATIVE PERCENT: 6.9 %
NEUTROPHILS ABSOLUTE: 9.8 K/UL (ref 1.4–6.5)
NEUTROPHILS RELATIVE PERCENT: 83.1 %
PDW BLD-RTO: 14.5 % (ref 11.5–14.5)
PLATELET # BLD: 369 K/UL (ref 130–400)
POTASSIUM SERPL-SCNC: 4.3 MEQ/L (ref 3.4–4.9)
PRO-BNP: 488 PG/ML
PROTHROMBIN TIME: 14 SEC (ref 12.3–14.9)
RBC # BLD: 4.51 M/UL (ref 4.7–6.1)
SARS-COV-2, NAAT: NOT DETECTED
SODIUM BLD-SCNC: 142 MEQ/L (ref 135–144)
TOTAL PROTEIN: 8.7 G/DL (ref 6.3–8)
TROPONIN: <0.01 NG/ML (ref 0–0.01)
WBC # BLD: 11.8 K/UL (ref 4.8–10.8)

## 2021-05-05 PROCEDURE — 6370000000 HC RX 637 (ALT 250 FOR IP): Performed by: INTERNAL MEDICINE

## 2021-05-05 PROCEDURE — 6360000002 HC RX W HCPCS: Performed by: EMERGENCY MEDICINE

## 2021-05-05 PROCEDURE — 93010 ELECTROCARDIOGRAM REPORT: CPT | Performed by: INTERNAL MEDICINE

## 2021-05-05 PROCEDURE — 2580000003 HC RX 258: Performed by: EMERGENCY MEDICINE

## 2021-05-05 PROCEDURE — 6370000000 HC RX 637 (ALT 250 FOR IP): Performed by: EMERGENCY MEDICINE

## 2021-05-05 PROCEDURE — 96374 THER/PROPH/DIAG INJ IV PUSH: CPT

## 2021-05-05 PROCEDURE — 83605 ASSAY OF LACTIC ACID: CPT

## 2021-05-05 PROCEDURE — 80053 COMPREHEN METABOLIC PANEL: CPT

## 2021-05-05 PROCEDURE — 99285 EMERGENCY DEPT VISIT HI MDM: CPT

## 2021-05-05 PROCEDURE — 94640 AIRWAY INHALATION TREATMENT: CPT

## 2021-05-05 PROCEDURE — 85025 COMPLETE CBC W/AUTO DIFF WBC: CPT

## 2021-05-05 PROCEDURE — 87040 BLOOD CULTURE FOR BACTERIA: CPT

## 2021-05-05 PROCEDURE — 1210000000 HC MED SURG R&B

## 2021-05-05 PROCEDURE — 96375 TX/PRO/DX INJ NEW DRUG ADDON: CPT

## 2021-05-05 PROCEDURE — 83880 ASSAY OF NATRIURETIC PEPTIDE: CPT

## 2021-05-05 PROCEDURE — 87635 SARS-COV-2 COVID-19 AMP PRB: CPT

## 2021-05-05 PROCEDURE — 83735 ASSAY OF MAGNESIUM: CPT

## 2021-05-05 PROCEDURE — 71045 X-RAY EXAM CHEST 1 VIEW: CPT

## 2021-05-05 PROCEDURE — 85610 PROTHROMBIN TIME: CPT

## 2021-05-05 PROCEDURE — 36415 COLL VENOUS BLD VENIPUNCTURE: CPT

## 2021-05-05 PROCEDURE — 84484 ASSAY OF TROPONIN QUANT: CPT

## 2021-05-05 PROCEDURE — 6360000002 HC RX W HCPCS: Performed by: INTERNAL MEDICINE

## 2021-05-05 PROCEDURE — 93005 ELECTROCARDIOGRAM TRACING: CPT

## 2021-05-05 RX ORDER — ASPIRIN 81 MG/1
81 TABLET, CHEWABLE ORAL DAILY
Status: DISCONTINUED | OUTPATIENT
Start: 2021-05-05 | End: 2021-05-08 | Stop reason: HOSPADM

## 2021-05-05 RX ORDER — LOSARTAN POTASSIUM 25 MG/1
25 TABLET ORAL DAILY
Status: DISCONTINUED | OUTPATIENT
Start: 2021-05-05 | End: 2021-05-08 | Stop reason: HOSPADM

## 2021-05-05 RX ORDER — LEVOTHYROXINE SODIUM 0.03 MG/1
25 TABLET ORAL DAILY
Status: DISCONTINUED | OUTPATIENT
Start: 2021-05-05 | End: 2021-05-08 | Stop reason: HOSPADM

## 2021-05-05 RX ORDER — SODIUM CHLORIDE 9 MG/ML
25 INJECTION, SOLUTION INTRAVENOUS PRN
Status: DISCONTINUED | OUTPATIENT
Start: 2021-05-05 | End: 2021-05-08 | Stop reason: HOSPADM

## 2021-05-05 RX ORDER — LEVOFLOXACIN 5 MG/ML
500 INJECTION, SOLUTION INTRAVENOUS EVERY 24 HOURS
Status: DISCONTINUED | OUTPATIENT
Start: 2021-05-06 | End: 2021-05-08 | Stop reason: HOSPADM

## 2021-05-05 RX ORDER — METHYLPREDNISOLONE SODIUM SUCCINATE 125 MG/2ML
125 INJECTION, POWDER, LYOPHILIZED, FOR SOLUTION INTRAMUSCULAR; INTRAVENOUS ONCE
Status: COMPLETED | OUTPATIENT
Start: 2021-05-05 | End: 2021-05-05

## 2021-05-05 RX ORDER — ACETAMINOPHEN 650 MG/1
650 SUPPOSITORY RECTAL EVERY 6 HOURS PRN
Status: DISCONTINUED | OUTPATIENT
Start: 2021-05-05 | End: 2021-05-08 | Stop reason: HOSPADM

## 2021-05-05 RX ORDER — PROMETHAZINE HYDROCHLORIDE 12.5 MG/1
12.5 TABLET ORAL EVERY 6 HOURS PRN
Status: DISCONTINUED | OUTPATIENT
Start: 2021-05-05 | End: 2021-05-08 | Stop reason: HOSPADM

## 2021-05-05 RX ORDER — ATORVASTATIN CALCIUM 40 MG/1
40 TABLET, FILM COATED ORAL DAILY
Status: DISCONTINUED | OUTPATIENT
Start: 2021-05-05 | End: 2021-05-08 | Stop reason: HOSPADM

## 2021-05-05 RX ORDER — ONDANSETRON 2 MG/ML
4 INJECTION INTRAMUSCULAR; INTRAVENOUS ONCE
Status: COMPLETED | OUTPATIENT
Start: 2021-05-05 | End: 2021-05-05

## 2021-05-05 RX ORDER — IPRATROPIUM BROMIDE AND ALBUTEROL SULFATE 2.5; .5 MG/3ML; MG/3ML
1 SOLUTION RESPIRATORY (INHALATION) ONCE
Status: COMPLETED | OUTPATIENT
Start: 2021-05-05 | End: 2021-05-05

## 2021-05-05 RX ORDER — METHYLPREDNISOLONE SODIUM SUCCINATE 40 MG/ML
40 INJECTION, POWDER, LYOPHILIZED, FOR SOLUTION INTRAMUSCULAR; INTRAVENOUS EVERY 8 HOURS
Status: DISCONTINUED | OUTPATIENT
Start: 2021-05-05 | End: 2021-05-08 | Stop reason: HOSPADM

## 2021-05-05 RX ORDER — SODIUM CHLORIDE 0.9 % (FLUSH) 0.9 %
3 SYRINGE (ML) INJECTION EVERY 8 HOURS
Status: DISCONTINUED | OUTPATIENT
Start: 2021-05-05 | End: 2021-05-08 | Stop reason: HOSPADM

## 2021-05-05 RX ORDER — MORPHINE SULFATE 4 MG/ML
4 INJECTION, SOLUTION INTRAMUSCULAR; INTRAVENOUS ONCE
Status: COMPLETED | OUTPATIENT
Start: 2021-05-05 | End: 2021-05-05

## 2021-05-05 RX ORDER — LEVOFLOXACIN 5 MG/ML
500 INJECTION, SOLUTION INTRAVENOUS ONCE
Status: COMPLETED | OUTPATIENT
Start: 2021-05-05 | End: 2021-05-05

## 2021-05-05 RX ORDER — IPRATROPIUM BROMIDE AND ALBUTEROL SULFATE 2.5; .5 MG/3ML; MG/3ML
1 SOLUTION RESPIRATORY (INHALATION) 3 TIMES DAILY
Status: DISCONTINUED | OUTPATIENT
Start: 2021-05-05 | End: 2021-05-08 | Stop reason: HOSPADM

## 2021-05-05 RX ORDER — SODIUM CHLORIDE 0.9 % (FLUSH) 0.9 %
5-40 SYRINGE (ML) INJECTION EVERY 12 HOURS SCHEDULED
Status: DISCONTINUED | OUTPATIENT
Start: 2021-05-05 | End: 2021-05-08 | Stop reason: HOSPADM

## 2021-05-05 RX ORDER — DILTIAZEM HYDROCHLORIDE 180 MG/1
180 CAPSULE, COATED, EXTENDED RELEASE ORAL DAILY
Status: DISCONTINUED | OUTPATIENT
Start: 2021-05-05 | End: 2021-05-05

## 2021-05-05 RX ORDER — LOSARTAN POTASSIUM 25 MG/1
25 TABLET ORAL DAILY
COMMUNITY
Start: 2021-04-01

## 2021-05-05 RX ORDER — POLYETHYLENE GLYCOL 3350 17 G/17G
17 POWDER, FOR SOLUTION ORAL DAILY PRN
Status: DISCONTINUED | OUTPATIENT
Start: 2021-05-05 | End: 2021-05-08 | Stop reason: HOSPADM

## 2021-05-05 RX ORDER — DILTIAZEM HYDROCHLORIDE 120 MG/1
120 CAPSULE, EXTENDED RELEASE ORAL DAILY
Status: DISCONTINUED | OUTPATIENT
Start: 2021-05-05 | End: 2021-05-06

## 2021-05-05 RX ORDER — DILTIAZEM HYDROCHLORIDE 120 MG/1
120 CAPSULE, EXTENDED RELEASE ORAL DAILY
COMMUNITY

## 2021-05-05 RX ORDER — CLOPIDOGREL BISULFATE 75 MG/1
75 TABLET ORAL DAILY
Status: DISCONTINUED | OUTPATIENT
Start: 2021-05-05 | End: 2021-05-08 | Stop reason: HOSPADM

## 2021-05-05 RX ORDER — IPRATROPIUM BROMIDE AND ALBUTEROL SULFATE 2.5; .5 MG/3ML; MG/3ML
1 SOLUTION RESPIRATORY (INHALATION) EVERY 4 HOURS PRN
Status: DISCONTINUED | OUTPATIENT
Start: 2021-05-05 | End: 2021-05-08 | Stop reason: HOSPADM

## 2021-05-05 RX ORDER — ACETAMINOPHEN 325 MG/1
650 TABLET ORAL EVERY 6 HOURS PRN
Status: DISCONTINUED | OUTPATIENT
Start: 2021-05-05 | End: 2021-05-08 | Stop reason: HOSPADM

## 2021-05-05 RX ORDER — SODIUM CHLORIDE 0.9 % (FLUSH) 0.9 %
5-40 SYRINGE (ML) INJECTION PRN
Status: DISCONTINUED | OUTPATIENT
Start: 2021-05-05 | End: 2021-05-08 | Stop reason: HOSPADM

## 2021-05-05 RX ORDER — ONDANSETRON 2 MG/ML
4 INJECTION INTRAMUSCULAR; INTRAVENOUS EVERY 6 HOURS PRN
Status: DISCONTINUED | OUTPATIENT
Start: 2021-05-05 | End: 2021-05-08 | Stop reason: HOSPADM

## 2021-05-05 RX ORDER — LEVOTHYROXINE SODIUM 0.03 MG/1
TABLET ORAL
COMMUNITY
Start: 2021-04-01

## 2021-05-05 RX ADMIN — METHYLPREDNISOLONE SODIUM SUCCINATE 40 MG: 40 INJECTION, POWDER, FOR SOLUTION INTRAMUSCULAR; INTRAVENOUS at 20:39

## 2021-05-05 RX ADMIN — IPRATROPIUM BROMIDE AND ALBUTEROL SULFATE 1 AMPULE: .5; 3 SOLUTION RESPIRATORY (INHALATION) at 19:00

## 2021-05-05 RX ADMIN — IPRATROPIUM BROMIDE AND ALBUTEROL SULFATE 1 AMPULE: .5; 3 SOLUTION RESPIRATORY (INHALATION) at 11:33

## 2021-05-05 RX ADMIN — ASPIRIN 81 MG CHEWABLE TABLET 81 MG: 81 TABLET CHEWABLE at 20:39

## 2021-05-05 RX ADMIN — LEVOFLOXACIN 500 MG: 5 INJECTION, SOLUTION INTRAVENOUS at 12:49

## 2021-05-05 RX ADMIN — ONDANSETRON 4 MG: 2 INJECTION INTRAMUSCULAR; INTRAVENOUS at 12:50

## 2021-05-05 RX ADMIN — Medication 3 ML: at 20:40

## 2021-05-05 RX ADMIN — MORPHINE SULFATE 4 MG: 4 INJECTION, SOLUTION INTRAMUSCULAR; INTRAVENOUS at 12:50

## 2021-05-05 RX ADMIN — Medication 3 ML: at 11:45

## 2021-05-05 RX ADMIN — ATORVASTATIN CALCIUM 40 MG: 40 TABLET, FILM COATED ORAL at 20:39

## 2021-05-05 RX ADMIN — METHYLPREDNISOLONE SODIUM SUCCINATE 125 MG: 125 INJECTION, POWDER, FOR SOLUTION INTRAMUSCULAR; INTRAVENOUS at 11:45

## 2021-05-05 RX ADMIN — ENOXAPARIN SODIUM 40 MG: 40 INJECTION SUBCUTANEOUS at 20:39

## 2021-05-05 ASSESSMENT — PAIN DESCRIPTION - LOCATION
LOCATION: GENERALIZED
LOCATION: CHEST

## 2021-05-05 ASSESSMENT — PAIN DESCRIPTION - DESCRIPTORS: DESCRIPTORS: SHARP;SHOOTING

## 2021-05-05 ASSESSMENT — PAIN SCALES - GENERAL: PAINLEVEL_OUTOF10: 0

## 2021-05-05 ASSESSMENT — PAIN DESCRIPTION - PROGRESSION: CLINICAL_PROGRESSION: NOT CHANGED

## 2021-05-05 ASSESSMENT — PAIN DESCRIPTION - ONSET: ONSET: SUDDEN

## 2021-05-05 NOTE — ED TRIAGE NOTES
Patient in with increasing SOB that has been getting worse over the last 4 days. Rates pain in his left lung with movement at an 8. Coughs clear phlem up.

## 2021-05-05 NOTE — PROGRESS NOTES
Spiritual Care Services     Summary of Visit:      Spiritual Assessment/Intervention/Outcomes:    Encounter Summary  Services provided to[de-identified] Patient  Referral/Consult From[de-identified] Rounding  Support System: Spouse, Children  Continue Visiting: No  Complexity of Encounter: Low  Length of Encounter: 15 minutes  Spiritual Assessment Completed: Yes  Routine  Type: Initial  Assessment: Calm, Approachable  Intervention: Tow, Sustaining presence/ Ministry of presence  Outcome: Receptive                               Care Plan:        Spiritual Care Services   Electronically signed by Martha Liu on 5/5/21 at 3:20 PM EDT     To reach a  for emotional and spiritual support, place an [facility] consult request.   If a  is needed immediately, dial 0 and ask to page the on-call .

## 2021-05-06 LAB
ANION GAP SERPL CALCULATED.3IONS-SCNC: 8 MEQ/L (ref 9–15)
BUN BLDV-MCNC: 30 MG/DL (ref 8–23)
CALCIUM SERPL-MCNC: 10 MG/DL (ref 8.5–9.9)
CHLORIDE BLD-SCNC: 107 MEQ/L (ref 95–107)
CO2: 24 MEQ/L (ref 20–31)
CREAT SERPL-MCNC: 1.5 MG/DL (ref 0.7–1.2)
GFR AFRICAN AMERICAN: 53.7
GFR NON-AFRICAN AMERICAN: 44.4
GLUCOSE BLD-MCNC: 157 MG/DL (ref 70–99)
HBA1C MFR BLD: 5.7 % (ref 4.8–5.9)
HCT VFR BLD CALC: 35.1 % (ref 42–52)
HEMOGLOBIN: 11.7 G/DL (ref 14–18)
MCH RBC QN AUTO: 29.8 PG (ref 27–31.3)
MCHC RBC AUTO-ENTMCNC: 33.3 % (ref 33–37)
MCV RBC AUTO: 89.8 FL (ref 80–100)
PDW BLD-RTO: 14.1 % (ref 11.5–14.5)
PLATELET # BLD: 324 K/UL (ref 130–400)
POTASSIUM REFLEX MAGNESIUM: 5 MEQ/L (ref 3.4–4.9)
RBC # BLD: 3.92 M/UL (ref 4.7–6.1)
SODIUM BLD-SCNC: 139 MEQ/L (ref 135–144)
WBC # BLD: 8.8 K/UL (ref 4.8–10.8)

## 2021-05-06 PROCEDURE — 97535 SELF CARE MNGMENT TRAINING: CPT

## 2021-05-06 PROCEDURE — 2700000000 HC OXYGEN THERAPY PER DAY

## 2021-05-06 PROCEDURE — 83036 HEMOGLOBIN GLYCOSYLATED A1C: CPT

## 2021-05-06 PROCEDURE — 6370000000 HC RX 637 (ALT 250 FOR IP): Performed by: INTERNAL MEDICINE

## 2021-05-06 PROCEDURE — 6360000002 HC RX W HCPCS: Performed by: INTERNAL MEDICINE

## 2021-05-06 PROCEDURE — 2580000003 HC RX 258: Performed by: EMERGENCY MEDICINE

## 2021-05-06 PROCEDURE — 2580000003 HC RX 258: Performed by: INTERNAL MEDICINE

## 2021-05-06 PROCEDURE — 1210000000 HC MED SURG R&B

## 2021-05-06 PROCEDURE — 80048 BASIC METABOLIC PNL TOTAL CA: CPT

## 2021-05-06 PROCEDURE — 94640 AIRWAY INHALATION TREATMENT: CPT

## 2021-05-06 PROCEDURE — 36415 COLL VENOUS BLD VENIPUNCTURE: CPT

## 2021-05-06 PROCEDURE — 97166 OT EVAL MOD COMPLEX 45 MIN: CPT

## 2021-05-06 PROCEDURE — 97116 GAIT TRAINING THERAPY: CPT

## 2021-05-06 PROCEDURE — 97162 PT EVAL MOD COMPLEX 30 MIN: CPT

## 2021-05-06 PROCEDURE — 85027 COMPLETE CBC AUTOMATED: CPT

## 2021-05-06 RX ORDER — DILTIAZEM HYDROCHLORIDE 180 MG/1
180 CAPSULE, COATED, EXTENDED RELEASE ORAL DAILY
Status: DISCONTINUED | OUTPATIENT
Start: 2021-05-07 | End: 2021-05-08 | Stop reason: HOSPADM

## 2021-05-06 RX ADMIN — PROMETHAZINE HYDROCHLORIDE 12.5 MG: 12.5 TABLET ORAL at 21:00

## 2021-05-06 RX ADMIN — DILTIAZEM HYDROCHLORIDE 30 MG: 30 TABLET, FILM COATED ORAL at 20:57

## 2021-05-06 RX ADMIN — Medication 3 ML: at 20:58

## 2021-05-06 RX ADMIN — ASPIRIN 81 MG CHEWABLE TABLET 81 MG: 81 TABLET CHEWABLE at 20:57

## 2021-05-06 RX ADMIN — LOSARTAN POTASSIUM 25 MG: 25 TABLET, FILM COATED ORAL at 08:17

## 2021-05-06 RX ADMIN — Medication 3 ML: at 13:31

## 2021-05-06 RX ADMIN — LEVOTHYROXINE SODIUM 25 MCG: 0.03 TABLET ORAL at 05:40

## 2021-05-06 RX ADMIN — IPRATROPIUM BROMIDE AND ALBUTEROL SULFATE 1 AMPULE: .5; 2.5 SOLUTION RESPIRATORY (INHALATION) at 18:07

## 2021-05-06 RX ADMIN — IPRATROPIUM BROMIDE AND ALBUTEROL SULFATE 1 AMPULE: .5; 2.5 SOLUTION RESPIRATORY (INHALATION) at 11:45

## 2021-05-06 RX ADMIN — METHYLPREDNISOLONE SODIUM SUCCINATE 40 MG: 40 INJECTION, POWDER, FOR SOLUTION INTRAMUSCULAR; INTRAVENOUS at 05:40

## 2021-05-06 RX ADMIN — IPRATROPIUM BROMIDE AND ALBUTEROL SULFATE 1 AMPULE: .5; 2.5 SOLUTION RESPIRATORY (INHALATION) at 06:08

## 2021-05-06 RX ADMIN — ENOXAPARIN SODIUM 40 MG: 40 INJECTION SUBCUTANEOUS at 20:57

## 2021-05-06 RX ADMIN — LEVOFLOXACIN 500 MG: 5 INJECTION, SOLUTION INTRAVENOUS at 14:02

## 2021-05-06 RX ADMIN — ATORVASTATIN CALCIUM 40 MG: 40 TABLET, FILM COATED ORAL at 20:57

## 2021-05-06 RX ADMIN — METHYLPREDNISOLONE SODIUM SUCCINATE 40 MG: 40 INJECTION, POWDER, FOR SOLUTION INTRAMUSCULAR; INTRAVENOUS at 20:57

## 2021-05-06 RX ADMIN — CLOPIDOGREL BISULFATE 75 MG: 75 TABLET ORAL at 20:57

## 2021-05-06 RX ADMIN — Medication 10 ML: at 08:18

## 2021-05-06 RX ADMIN — METHYLPREDNISOLONE SODIUM SUCCINATE 40 MG: 40 INJECTION, POWDER, FOR SOLUTION INTRAMUSCULAR; INTRAVENOUS at 13:31

## 2021-05-06 ASSESSMENT — PAIN SCALES - GENERAL: PAINLEVEL_OUTOF10: 0

## 2021-05-06 NOTE — PROGRESS NOTES
Occupational Therapy   Occupational Therapy Initial Assessment- Med  Date: 2021   Patient Name: Bettina Lao  MRN: [id number]     : 1935    Date of Service: 2021    Discharge Recommendations:  Home with Home health OT       Assessment   Performance deficits / Impairments: Decreased functional mobility ; Decreased ADL status; Decreased endurance;Decreased balance;Decreased high-level IADLs  Assessment: Pt is an 81y/o male PLOF lives with spouse, was I/MI with ADL, whom presents to [facility] 2* COPD exacerbation, pneumonia. Pt demo's the above resulting perf def/impair and would benefit from OT skilled services to maximize strength/end and safety/I with ADL for safe d/c transition. Treatment Diagnosis: COPD exacerbation, pneumonia  Prognosis: Good  History: multi comorb  Exam: 5 perf def/impair  OT Education: OT Role;Plan of Care  REQUIRES OT FOLLOW UP: Yes  Safety Devices  Safety Devices in place: Yes  Type of devices: All fall risk precautions in place           Patient Diagnosis(es): The primary encounter diagnosis was COPD exacerbation ([facility] [address] A diagnosis of Pneumonia due to organism was also pertinent to this visit. has a past medical history of Arthritis, CAD (coronary artery disease), Hyperlipidemia, Hypertension, and Lung disease. has a past surgical history that includes Carotid stent placement.     Treatment Diagnosis: COPD exacerbation, pneumonia      Restrictions  Restrictions/Precautions  Restrictions/Precautions: Up as Tolerated    Subjective   General  Chart Reviewed: Yes  Patient assessed for rehabilitation services?: Yes  Family / Caregiver Present: No  Referring Practitioner: Dr. Mindy Gamez  Diagnosis: COPD exacerbation, pneumonia  Subjective  Subjective: Pt agreeable to OT eval/tx  Patient Currently in Pain: No  Pain Assessment  Pain Assessment: 0-10  Pain Level: 0  Vital Signs  Patient Currently in Pain: No     Social/Functional History  Social/Functional History  Lives With: Spouse  Type of Home: House  Home Layout: Multi-level  Home Access: Stairs to enter with rails  Entrance Stairs - Number of Steps: 6  Entrance Stairs - Rails: Left  Bathroom Shower/Tub: Tub/Shower unit  Bathroom Equipment: Grab bars in shower  ADL Assistance: Independent  Ambulation Assistance: Independent  Transfer Assistance: Independent  Active : Yes  Occupation: Retired  Type of occupation: worked at [employer] [address].: Impaired  Vision Exceptions: Wears glasses at all times  Hearing: Within functional limits    Orientation  Overall Orientation Status: Within Normal Limits  Observation/Palpation  Posture: Good  Observation: Pt on 3L O2 via NC- O2 sats 98% at rest with  BPM. Pt with LUE IV. Pt wore O2 prn at home and did not use for showers- nsg gave permission to trial shower w/o O2 on. Pt's O2 sats 78% post shower with  BPM- informed nsg. Recovered O2 sats ~1min with seated RB pursed lip breathing. Educated pt on he should keep O2 on for showers from now on. Glasses. Functional Mobility  Functional - Mobility Device: No device  Activity: To/from bathroom  Assist Level: Stand by assistance  Shower Transfers  Shower - Transfer Type: To and From  Shower - Transfer To:  Shower seat with back  Shower - Technique: Ambulating(small step into/out of shower stall, holding grab bar)  Shower Transfers: Contact Guard;Stand by assistance  ADL  Feeding: Independent  Grooming: Independent  UE Bathing: Setup;Supervision  LE Bathing: Setup;Supervision  UE Dressing: Modified independent   LE Dressing: Contact guard assistance;Stand by assistance  Toileting: Stand by assistance  Tone RUE  RUE Tone: Normotonic  Tone LUE  LUE Tone: Normotonic  Coordination  Movements Are Fluid And Coordinated: Yes     Bed mobility  Supine to Sit: Modified independent  Transfers  Slide Board: Modified independent  Sit to stand: Modified independent              Sensation  Overall Sensation Status: WNL        LUE AROM (degrees)  LUE AROM : WFL  Left Hand AROM (degrees)  Left Hand AROM: WFL  RUE AROM (degrees)  RUE AROM : WFL  Right Hand AROM (degrees)  Right Hand AROM: WFL  LUE Strength  Gross LUE Strength: WFL  RUE Strength  Gross RUE Strength: WFL     Hand Dominance  Hand Dominance: Right             Plan   Plan  Times per week: 3-6x/wk  Times per day: Daily  Plan weeks: <1- med pt  Current Treatment Recommendations: Strengthening, Balance Training, Functional Mobility Training, Endurance Training, Safety Education & Training, Patient/Caregiver Education & Training, Self-Care / ADL, Home Management Training            Goals  Short term goals  Time Frame for Short term goals: 3-5 days- med pt  Short term goal 1: I/MI toileting  Short term goal 2: I/MI UB/LB bathing  Short term goal 3: I/MI LB dressing  Short term goal 4: Inc to 8-10min stand alexandre/end, with O2 sats 90% or above, to inc safety and I with ADL  Short term goal 5: I BUE HEP  Long term goals  Time Frame for Long term goals : Same as STGs  Long term goal 1: Same as STGs  Long term goal 2: Same as STGs  Patient Goals   Patient goals :  To return home       Therapy Time   Individual Concurrent Group Co-treatment   Time In  1:15         Time Out  2:15         Minutes  [facility]

## 2021-05-06 NOTE — PROGRESS NOTES
Physical Therapy    Facility/Department: [facility] MED SURG UNIT  Initial Assessment    NAME: Sameera Mcelroy  : 1935  MRN: [id number]    Date of Service: 2021    Discharge Recommendations:  Continue to assess pending progress, Home with assist PRN   PT Equipment Recommendations  Equipment Needed: No    Assessment   Body structures, Functions, Activity limitations: Decreased functional mobility ; Decreased endurance  Assessment: Pt is an 81 yo male admitted wtih COPD exacerbation, SOB limiting functional mobility. Pt shows good LE strength and fairly good balance, but O2 level drop quickly with limited activity with ~4 min seated recovery time. Should benefit from course of PT while in house to help imrpove activith tolerance and safety with mobility with focus on gait, stairs and activity tolerance. Otherwise fairly healthy and indepnednet at baseline and feel pt. should be safe for d/c home with family once medically stable. Treatment Diagnosis: decreased ambulation, impaired functional endurance  Prognosis: Good  Decision Making: Medium Complexity  PT Education: Goals;PT Role;Plan of Care  Barriers to Learning: none  REQUIRES PT FOLLOW UP: Yes  Activity Tolerance  Activity Tolerance: denies pain, but + SOB with desaturation withlimited activity noted       Patient Diagnosis(es): The primary encounter diagnosis was COPD exacerbation ([address] A diagnosis of Pneumonia due to organism was also pertinent to this visit. has a past medical history of Arthritis, CAD (coronary artery disease), Hyperlipidemia, Hypertension, and Lung disease. has a past surgical history that includes Carotid stent placement.     Restrictions  Restrictions/Precautions  Restrictions/Precautions: Up as Tolerated  Vision/Hearing  Vision: Impaired  Vision Exceptions: Wears glasses at all times  Hearing: Within functional limits     Subjective  General  Chart Reviewed: Yes  Patient assessed for rehabilitation services?: Yes  Additional Pertinent Hx: on intermittent O2 at home, 3L, but not constant \"just when things flare up\"  Family / Caregiver Present: No  Referring Practitioner: Dr. Poli Pace  Referral Date : 05/05/21  Diagnosis: SOB, dypsnea, R lower chest pain, COPD exacerbation  Follows Commands: Within Functional Limits  Subjective  Subjective: Pt. states he developed severe R low back pain about 6 days ago, did not improve with general interventions and started to feel worse, evenetually came to ED yesterday adn admitted with COPD exacerbation.  States he is already feeling better wiht less pain \"I can twist and turn\"  Pain Screening  Patient Currently in Pain: No  Pain Assessment  Pain Assessment: 0-10  Vital Signs  Patient Currently in Pain: No  Oxygen Therapy  SpO2: 97 %  Pulse Oximeter Device Mode: Intermittent  Pulse Oximeter Device Location: Right;Finger  O2 Device: Nasal cannula  O2 Flow Rate (L/min): 2 L/min       Orientation  Orientation  Overall Orientation Status: Within Normal Limits  Social/Functional History  Social/Functional History  Lives With: Spouse  Type of Home: House  Home Layout: Multi-level  Home Access: Stairs to enter with rails  Entrance Stairs - Number of Steps: 6  Entrance Stairs - Rails: Left  Bathroom Shower/Tub: Tub/Shower unit  Bathroom Equipment: Grab bars in shower  ADL Assistance: Independent  Ambulation Assistance: Independent  Transfer Assistance: Independent  Active : Yes  Occupation: Retired  Type of occupation: worked at [employer]        Objective     Observation/Palpation  Posture: Good    PROM RLE (degrees)  RLE PROM: WFL  PROM LLE (degrees)  LLE PROM: WFL  Strength RLE  Strength RLE: WNL  Strength LLE  Strength LLE: WNL        Bed mobility  Rolling to Left: Independent  Rolling to Right: Independent  Supine to Sit: Independent  Sit to Supine: Independent  Transfers  Sit to Stand: Supervision  Stand to sit: Supervision  Ambulation  Ambulation?: Yes  More Ambulation?: Yes  Ambulation 1  Surface: level tile  Device: Rolling Walker  Other Apparatus: O2  Assistance: Supervision  Quality of Gait: good lenny, steady  Gait Deviations: None  Distance: 130 ft x 1  Comments: Pulse ox down to 84%,  afterward; recovers with 3-4 min rest  Ambulation 2  Surface - 2: level tile  Device 2: No device  Other Apparatus 2: O2  Assistance 2: Contact guard assistance  Quality of Gait 2: steady, occ lateral sway but with self recovery  Gait Deviations: Slow Lenny  Distance: 130 ft. x 1  Comments: pulse ox 84%,  afterwards; ~4 min recovery with cues for deep breathing  Stairs/Curb  Stairs?: No              Plan   Plan  Times per week: 5-7  Times per day: Daily  Plan weeks: 2-3 days  Current Treatment Recommendations: Transfer Training, Endurance Training, Home Exercise Program, Gait Training, Balance Training, Functional Mobility Training, Stair training  Safety Devices  Type of devices: Call light within reach, Left in bed, Gait belt, Bed alarm in place    G-Code       OutComes Score                                                  AM-PAC Score             Goals  Short term goals  Time Frame for Short term goals: same as LTGs  Long term goals  Time Frame for Long term goals : 2-3 days  Long term goal 1: Pt independent with all transfers without device  Long term goal 2: Improve endurance to ambulate >250 ft without device, with O2 modified independent level surfaces  Long term goal 3: up/down 3 stairs with rail modified independent  Long term goal 4: independent with HEP using handout for longterm  health management  Patient Goals   Patient goals : get back home       Therapy Time   Individual Concurrent Group Co-treatment   Time In 1105         Time Out 1140         Minutes 28                 Luzma Patino, PT

## 2021-05-06 NOTE — H&P
Hospital Medicine History & Physical      PCP: Jose Luis Redding MD    Date of Admission: 5/5/2021    Date of Service: 5/6/21      Chief Complaint:  Dyspnea, SOB, R lower sided CP      History Of Present Illness:  80 y.o. male who presented to [facility] with above complains x 6 days. He has long standing history of COPD/emphysema, pulmonary fibrosis, on home O2. For the past 6 days noted increased dyspnea, SOB especially during physical activity, was using more O2. Had R sided pain during deep inspiration. Has cough with sputum production. Denied fever. Since he dad no improvement, came to ER for further evaluation. Past Medical History:          Diagnosis Date    Arthritis     CAD (coronary artery disease)     Hyperlipidemia     Hypertension     Lung disease        Past Surgical History:          Procedure Laterality Date    CAROTID STENT PLACEMENT         Medications Prior to Admission:      Prior to Admission medications    Medication Sig Start Date End Date Taking? Authorizing Provider   levothyroxine (SYNTHROID) 25 MCG tablet take 1 tablet by mouth every morning ON AN EMPTY STOMACH 4/1/21  Yes Historical Provider, MD   losartan (COZAAR) 25 MG tablet Take 25 mg by mouth daily 4/1/21  Yes Historical Provider, MD   Fexofenadine HCl (ALLEGRA PO) Take by mouth   Yes Historical Provider, MD   dilTIAZem (DILACOR XR) 120 MG extended release capsule Take 120 mg by mouth daily   Yes Historical Provider, MD   clopidogrel (PLAVIX) 75 MG tablet  9/28/19  Yes Historical Provider, MD   atorvastatin (LIPITOR) 40 MG tablet Take 40 mg by mouth daily  11/17/18  Yes Historical Provider, MD   aspirin 81 MG chewable tablet Take 81 mg by mouth daily   Yes Historical Provider, MD   Multiple Vitamins-Minerals (PRESERVISION AREDS 2) CAPS Take 1 tablet by mouth daily   Yes Historical Provider, MD   nitroGLYCERIN (NITROSTAT) 0.4 MG SL tablet up to max of 3 total doses. If no relief after 1 dose, call 911.   Patient taking differently: Place 0.4 mg under the tongue every 5 minutes as needed up to max of 3 total doses. If no relief after 1 dose, call 911. 3/27/18   Gabrielle Almonte MD       Allergies:  Patient has no known allergies. Social History:      The patient currently lives home    TOBACCO:   reports that he has quit smoking. He has never used smokeless tobacco.  ETOH:   reports no history of alcohol use. Family History:       Reviewed in detail and negative for DM, CAD, Cancer, CVA. Positive as follows:        Problem Relation Age of Onset    Cancer Father     Stroke Father        REVIEW OF SYSTEMS:   Pertinent positives as noted in the HPI. All other systems reviewed and negative. PHYSICAL EXAM:    BP (!) 164/81   Pulse 97   Temp 98.1 °F (36.7 °C) (Oral)   Resp 18   Ht 6' (1.829 m)   Wt 170 lb (77.1 kg)   SpO2 97%   BMI 23.06 kg/m²     General appearance:  No apparent distress, appears stated age and cooperative. HEENT:  Normal cephalic, atraumatic without obvious deformity. Pupils equal, round, and reactive to light. Extra ocular muscles intact. Conjunctivae/corneas clear. Neck: Supple, with full range of motion. No jugular venous distention. Trachea midline. Respiratory:  Normal respiratory effort. bilaterally with expiratory  Wheezes/Rhonchi. Cardiovascular:  Regular rate and rhythm with normal S1/S2 without murmurs, rubs or gallops. Abdomen: Soft, non-tender, non-distended with normal bowel sounds. Musculoskeletal:  No clubbing, cyanosis or edema bilaterally. Full range of motion without deformity. Skin: Skin color, texture, turgor normal.  No rashes or lesions. Neurologic:  Neurovascularly intact without any focal sensory/motor deficits.  Cranial nerves: II-XII intact, grossly non-focal.  Psychiatric:  Alert and oriented, thought content appropriate, normal insight  Capillary Refill: Brisk,< 3 seconds   Peripheral Pulses: +2 palpable, equal bilaterally       Labs:     Recent Labs 05/05/21  1138 05/06/21  0454   WBC 11.8* 8.8   HGB 13.5* 11.7*   HCT 40.2* 35.1*    324     Recent Labs     05/05/21  1138 05/06/21  0454    139   K 4.3 5.0*    107   CO2 25 24   BUN 19 30*   CREATININE 1.32* 1.50*   CALCIUM 10.4* 10.0*     Recent Labs     05/05/21  1138   AST 21   ALT 21   BILITOT 0.5   ALKPHOS 120*     Recent Labs     05/05/21  1138   INR 1.1     Recent Labs     05/05/21  1138   TROPONINI <0.010       Urinalysis:    No results found for: NITRU, WBCUA, BACTERIA, RBCUA, BLOODU, SPECGRAV, GLUCOSEU        XR CHEST PORTABLE   Final Result      Findings compatible with emphysema with by basilar vascular crowding and interstitial lung change. However, cannot exclude superimposed bibasilar atelectasis/pneumonia. ASSESSMENT:    Active Hospital Problems    Diagnosis Date Noted    [facility] [J44.1] 05/05/2021       PLAN:        DVT Prophylaxis:   Diet: DIET GENERAL;  Code Status: Full Code    PT/OT Eval Status:     Dispo - Dyspnea, cough with sputum production, SOB- pt treating for COPD exacerbation  Bibasilar CAP- atbs initiated, follow up clinically  Pleuritic R sided CP-due to above- better today  Chronic respiratory failure with desaturation- O2 as ordered, pt has O2 at home  HTN- meds restarted, follow up clinically   Discussed regarding code status with patient and her wife at bedside- remained full code during this admission  Medically stable for acute admission at Ashleigh Hodge MD    Thank you Lyndon Uriostegui MD for the opportunity to be involved in this patient's care. If you have any questions or concerns please feel free to contact me.

## 2021-05-06 NOTE — PROGRESS NOTES
Patient awake in bed this morning. No complaints at this time. Medications provided and assessment completed. Call light and belongings within reach.

## 2021-05-07 LAB
ANION GAP SERPL CALCULATED.3IONS-SCNC: 12 MEQ/L (ref 9–15)
BUN BLDV-MCNC: 44 MG/DL (ref 8–23)
CALCIUM SERPL-MCNC: 10 MG/DL (ref 8.5–9.9)
CHLORIDE BLD-SCNC: 107 MEQ/L (ref 95–107)
CO2: 22 MEQ/L (ref 20–31)
CREAT SERPL-MCNC: 1.75 MG/DL (ref 0.7–1.2)
GFR AFRICAN AMERICAN: 44.9
GFR NON-AFRICAN AMERICAN: 37.1
GLUCOSE BLD-MCNC: 164 MG/DL (ref 70–99)
POTASSIUM SERPL-SCNC: 4.7 MEQ/L (ref 3.4–4.9)
SODIUM BLD-SCNC: 141 MEQ/L (ref 135–144)

## 2021-05-07 PROCEDURE — 6370000000 HC RX 637 (ALT 250 FOR IP): Performed by: INTERNAL MEDICINE

## 2021-05-07 PROCEDURE — 97535 SELF CARE MNGMENT TRAINING: CPT

## 2021-05-07 PROCEDURE — 97116 GAIT TRAINING THERAPY: CPT

## 2021-05-07 PROCEDURE — 2700000000 HC OXYGEN THERAPY PER DAY

## 2021-05-07 PROCEDURE — 36415 COLL VENOUS BLD VENIPUNCTURE: CPT

## 2021-05-07 PROCEDURE — 2580000003 HC RX 258: Performed by: INTERNAL MEDICINE

## 2021-05-07 PROCEDURE — 1210000000 HC MED SURG R&B

## 2021-05-07 PROCEDURE — 94640 AIRWAY INHALATION TREATMENT: CPT

## 2021-05-07 PROCEDURE — 80048 BASIC METABOLIC PNL TOTAL CA: CPT

## 2021-05-07 PROCEDURE — 6360000002 HC RX W HCPCS: Performed by: INTERNAL MEDICINE

## 2021-05-07 PROCEDURE — 97530 THERAPEUTIC ACTIVITIES: CPT

## 2021-05-07 PROCEDURE — 2580000003 HC RX 258: Performed by: EMERGENCY MEDICINE

## 2021-05-07 RX ORDER — GUAIFENESIN/DEXTROMETHORPHAN 100-10MG/5
5 SYRUP ORAL EVERY 4 HOURS PRN
Status: DISCONTINUED | OUTPATIENT
Start: 2021-05-07 | End: 2021-05-08 | Stop reason: HOSPADM

## 2021-05-07 RX ORDER — GUAIFENESIN 600 MG/1
600 TABLET, EXTENDED RELEASE ORAL 2 TIMES DAILY
Status: DISCONTINUED | OUTPATIENT
Start: 2021-05-07 | End: 2021-05-08 | Stop reason: HOSPADM

## 2021-05-07 RX ADMIN — IPRATROPIUM BROMIDE AND ALBUTEROL SULFATE 1 AMPULE: .5; 2.5 SOLUTION RESPIRATORY (INHALATION) at 06:24

## 2021-05-07 RX ADMIN — GUAIFENESIN SYRUP AND DEXTROMETHORPHAN 5 ML: 100; 10 SYRUP ORAL at 13:40

## 2021-05-07 RX ADMIN — IPRATROPIUM BROMIDE AND ALBUTEROL SULFATE 1 AMPULE: .5; 2.5 SOLUTION RESPIRATORY (INHALATION) at 18:01

## 2021-05-07 RX ADMIN — Medication 3 ML: at 13:41

## 2021-05-07 RX ADMIN — METHYLPREDNISOLONE SODIUM SUCCINATE 40 MG: 40 INJECTION, POWDER, FOR SOLUTION INTRAMUSCULAR; INTRAVENOUS at 13:40

## 2021-05-07 RX ADMIN — ASPIRIN 81 MG CHEWABLE TABLET 81 MG: 81 TABLET CHEWABLE at 20:59

## 2021-05-07 RX ADMIN — IPRATROPIUM BROMIDE AND ALBUTEROL SULFATE 1 AMPULE: .5; 2.5 SOLUTION RESPIRATORY (INHALATION) at 11:45

## 2021-05-07 RX ADMIN — LOSARTAN POTASSIUM 25 MG: 25 TABLET, FILM COATED ORAL at 08:35

## 2021-05-07 RX ADMIN — METHYLPREDNISOLONE SODIUM SUCCINATE 40 MG: 40 INJECTION, POWDER, FOR SOLUTION INTRAMUSCULAR; INTRAVENOUS at 06:10

## 2021-05-07 RX ADMIN — Medication 10 ML: at 20:58

## 2021-05-07 RX ADMIN — CLOPIDOGREL BISULFATE 75 MG: 75 TABLET ORAL at 20:59

## 2021-05-07 RX ADMIN — DILTIAZEM HYDROCHLORIDE 180 MG: 180 CAPSULE, COATED, EXTENDED RELEASE ORAL at 08:35

## 2021-05-07 RX ADMIN — GUAIFENESIN 600 MG: 600 TABLET, EXTENDED RELEASE ORAL at 08:38

## 2021-05-07 RX ADMIN — ATORVASTATIN CALCIUM 40 MG: 40 TABLET, FILM COATED ORAL at 20:59

## 2021-05-07 RX ADMIN — LEVOTHYROXINE SODIUM 25 MCG: 0.03 TABLET ORAL at 06:10

## 2021-05-07 RX ADMIN — Medication 10 ML: at 08:38

## 2021-05-07 RX ADMIN — GUAIFENESIN 600 MG: 600 TABLET, EXTENDED RELEASE ORAL at 20:59

## 2021-05-07 RX ADMIN — ENOXAPARIN SODIUM 40 MG: 40 INJECTION SUBCUTANEOUS at 20:59

## 2021-05-07 RX ADMIN — METHYLPREDNISOLONE SODIUM SUCCINATE 40 MG: 40 INJECTION, POWDER, FOR SOLUTION INTRAMUSCULAR; INTRAVENOUS at 20:58

## 2021-05-07 RX ADMIN — LEVOFLOXACIN 500 MG: 5 INJECTION, SOLUTION INTRAVENOUS at 13:40

## 2021-05-07 ASSESSMENT — PAIN SCALES - GENERAL: PAINLEVEL_OUTOF10: 0

## 2021-05-07 NOTE — PROGRESS NOTES
Report received from [facility]. Pt sitting up in chair. Denies SOB at this time. PRN robitussin administered per request for occasional cough. Lung sounds diminished. Pt denies pain. Resting comfortably at this time.  Electronically signed by Avni Bettencourt RN on 5/7/2021 at 1:56 PM

## 2021-05-07 NOTE — PLAN OF CARE
Problem: Falls - Risk of:  Goal: Will remain free from falls  Description: Will remain free from falls  Outcome: Met This Shift  Goal: Absence of physical injury  Description: Absence of physical injury  Outcome: Met This Shift     Problem: Discharge Planning:  Goal: Discharged to appropriate level of care  Description: Discharged to appropriate level of care  Outcome: Ongoing     Problem:  Activity Intolerance:  Goal: Ability to tolerate increased activity will improve  Description: Ability to tolerate increased activity will improve  Outcome: Met This Shift     Problem: Airway Clearance - Ineffective:  Goal: Ability to maintain a clear airway will improve  Description: Ability to maintain a clear airway will improve  Outcome: Met This Shift     Problem: Breathing Pattern - Ineffective:  Goal: Ability to achieve and maintain a regular respiratory rate will improve  Description: Ability to achieve and maintain a regular respiratory rate will improve  Outcome: Met This Shift     Problem: Gas Exchange - Impaired:  Goal: Levels of oxygenation will improve  Description: Levels of oxygenation will improve  Outcome: Met This Shift

## 2021-05-07 NOTE — PROGRESS NOTES
texture, turgor normal.  No rashes or lesions. Neurologic:  Neurovascularly intact without any focal sensory/motor deficits. Cranial nerves: II-XII intact, grossly non-focal.  Psychiatric: Alert and oriented, thought content appropriate, normal insight  Capillary Refill: Brisk,< 3 seconds   Peripheral Pulses: +2 palpable, equal bilaterally       Labs:   Recent Labs     05/05/21  1138 05/06/21  0454   WBC 11.8* 8.8   HGB 13.5* 11.7*   HCT 40.2* 35.1*    324     Recent Labs     05/05/21  1138 05/06/21  0454 05/07/21  0657    139 141   K 4.3 5.0* 4.7    107 107   CO2 25 24 22   BUN 19 30* 44*   CREATININE 1.32* 1.50* 1.75*   CALCIUM 10.4* 10.0* 10.0*     Recent Labs     05/05/21  1138   AST 21   ALT 21   BILITOT 0.5   ALKPHOS 120*     Recent Labs     05/05/21  1138   INR 1.1     Recent Labs     05/05/21  1138   TROPONINI <0.010       Urinalysis:    No results found for: Bala Marten, BACTERIA, RBCUA, BLOODU, SPECGRAV, GLUCOSEU    Radiology:  XR CHEST PORTABLE   Final Result      Findings compatible with emphysema with by basilar vascular crowding and interstitial lung change. However, cannot exclude superimposed bibasilar atelectasis/pneumonia. Assessment/Plan:    Active Hospital Problems    Diagnosis Date Noted    COPD exacerbation ([facility] [address] [J44.1] 05/05/2021         DVT Prophylaxis: lovenox  Diet: DIET GENERAL;  Code Status: Full Code    PT/OT Eval Status: done    Dispo - Dyspnea, cough with sputum production, SOB- pt treating for COPD exacerbation- better today with current Tx  Bibasilar CAP- atbs initiated, follow up clinically  Pleuritic R sided CP-due to above- improving  Chronic respiratory failure with desaturation- O2 as ordered, pt has O2 at home  HTN- meds restarted, follow up clinically   Medically stable for acute admission at [facility].  If continue to improve- DC home AM        Electronically signed by Joanne Torres MD on 5/7/2021 at 8:33 AM

## 2021-05-07 NOTE — PROGRESS NOTES
Physical Therapy  Facility/Department: [facility] MED SURG UNIT  Daily Treatment Note  NAME: Tobi Porras  : 1935  MRN: [id number]    Date of Service: 2021    Discharge Recommendations:  (P) Home with assist PRN   PT Equipment Recommendations  Equipment Needed: (P) No    Assessment   Assessment: (P) Pt is pleasant, motivated- agreeable to interventions. Pt does become SOB quickly with WBing activity, SP02 maintained at 93% with seated ther ex/trunk mobility exercises, desat to 87-88% with unsupported gait and supported standing exercises, recovering within 2 min of seated rest. Pt tends to move quickly, requiring cues for pacing in order to maximize activity tolerance/respiratory function. Educated pt on importance of sitting up in chair/OOB during the day- pt agreeable, in recliner at end of session, no pain noted. This is good- I guess I can go back to yoga\". REQUIRES PT FOLLOW UP: (P) Yes  Activity Tolerance  Activity Tolerance: (P) Patient limited by endurance     Patient Diagnosis(es): The primary encounter diagnosis was COPD exacerbation ([facility] [address] A diagnosis of Pneumonia due to organism was also pertinent to this visit. has a past medical history of Arthritis, CAD (coronary artery disease), Hyperlipidemia, Hypertension, and Lung disease. has a past surgical history that includes Carotid stent placement. Restrictions  Restrictions/Precautions  Restrictions/Precautions: (P) Up as Tolerated  Subjective   General  Chart Reviewed: (P) Yes  Additional Pertinent Hx: (P) on intermittent O2 at home, 3L, but not constant \"just when things flare up\"  Family / Caregiver Present: (P) No  Referring Practitioner: (P) Dr. Lisbeth Florian  Subjective: (P) Pt in bed; awake- HOB elevated. Comments: (P) O2 donned; 3L- intact throughout tx session.  Sp02 97% at rest.   Pain Screening  Patient Currently in Pain: (P) No  V     Orientation     Cognition      Objective    Bed Mobility: Indep supine > sit, HOB in elevated position  Transfers  Sit to Stand: (P) Supervision  Stand to sit: (P) Supervision  Stand Pivot Transfers: (P) Supervision  Ambulation  Ambulation?: (P) Yes  Ambulation 1  Surface: (P) level tile  Device: (P) No Device  Other Apparatus: (P) O2  Assistance: (P) Contact guard assistance;Stand by assistance  Quality of Gait: (P) even, consistent steps- tendancy to keep head down  Gait Deviations: (P) None  Distance: (P) 85' x 2  Comments: (P) seated rest in between  Stairs/Curb  Stairs?: (P) Yes  Stairs  # Steps : (P) 2  Stairs Height: (P) 6\"  Rails: (P) Bilateral  Assistance: (P) Contact guard assistance;Stand by assistance  Comment: (P) assist to manage O2 tubing- pt demo reciprocal step pattern, pt able to negotiate therapy stair assembly 2x with 1 standing rest break in between- ambulated directly back to room afterwards- SP02 88%, recovering to 91% in just under 2 min and 93% after an additonal 30 sec. Balance  Posture: (P) Good  Sitting - Static: (P) Good  Sitting - Dynamic: (P) Good;-  Standing - Static: (P) Good;-  Standing - Dynamic: (P) Fair;+  Exercises  Hip Flexion: (P) supported standing: fwd kicks x 10  Hip Extension/Leg Presses: (P) supported std: x 10  Hip Abduction: (P) supported std: x 10  Ankle Pumps: (P) supported standing: heel raises x 20  Comments: (P) Sp02 desat to 88-89% following supported standing exercises- recovering to 92% within 2 min of seated rest, pt demo effective breathing pattern.    Other exercises  Other exercises?: (P) Yes  Other exercises 1: (P) seated march x 10  Other exercises 2: (P) seated trunk flex/ext to upright position x 5  Other exercises 3: (P) seated trunk rotation x 5  Other exercises 4: (P) seated side bend x 5         Other Activities: (P) Other (see comment)  Comment: (P) Pt able to don pants Indep once sitting up at EOB              G-Code     OutComes Score                                                     AM-PAC Score             Goals  Short term

## 2021-05-07 NOTE — PROGRESS NOTES
Occupational Therapy  Facility/Department: [facility] MED SURG UNIT  Daily Treatment Note  NAME: Carolee Walsh  : 1935  MRN: [id number]    Date of Service: 2021    Discharge Recommendations:  Home with Home health OT       Assessment      REQUIRES OT FOLLOW UP: Yes     Pt. Was sitting up in the recliner. Pt. Was agreeable to bathing/dressing with OT. Pt. Stated he has no pain. Pt. Stated he Does not feel SOB. Pt. Is on 3 liters of O2.Pt.'s O2 was at 99% before, during,and after treatment. It was taken 3 x. Pt. Demonstrated UB bathing/dressing at supervision/Mod I. Pt. Demonstrated LB bathing/dressing at supervision/SBA. Pt. demonstrated toileting at supervision and toilet transfer at supervision. Pt. tolerated standing for 5 minutes 2 x with no device. Pt. Is polite. Answered pt.'s questions and concerns. Pt. Does have a cough. Patient Diagnosis(es): The primary encounter diagnosis was COPD exacerbation ([facility] [address] A diagnosis of Pneumonia due to organism was also pertinent to this visit. has a past medical history of Arthritis, CAD (coronary artery disease), Hyperlipidemia, Hypertension, and Lung disease. has a past surgical history that includes Carotid stent placement. Restrictions  Restrictions/Precautions  Restrictions/Precautions: Up as Tolerated  Subjective   General  Chart Reviewed: Yes  Patient assessed for rehabilitation services?: Yes  Family / Caregiver Present: No  Referring Practitioner: Dr. Benjie Nguyen  Diagnosis: COPD exacerbation, pneumonia  Subjective  Subjective: Pt. was agreeable to bathing/dressing with OT.   Oxygen Therapy  O2 Flow Rate (L/min): (3 liters of O2)      Objective      ADL  Grooming: Independent  UE Bathing: Setup;Supervision  LE Bathing: Setup;Supervision  UE Dressing: Modified independent   LE Dressing: Supervision  Toileting: Stand by assistance  Additional Comments: bath after s/u         Standing Balance  Time: (5-6 minutes with no LOB) 2 x     Transfers  Stand to sit: Modified independent  Sit to stand: Modified independent                       Plan   Plan  Times per week: 3-6x/wk  Times per day: Daily  Plan weeks: <1- med pt  Current Treatment Recommendations: Strengthening, Balance Training, Functional Mobility Training, Endurance Training, Safety Education & Training, Patient/Caregiver Education & Training, Self-Care / ADL, Home Management Training               Goals  Short term goals  Time Frame for Short term goals: 3-5 days- med pt  Short term goal 1: I/MI toileting  Short term goal 2: I/MI UB/LB bathing  Short term goal 3: I/MI LB dressing  Short term goal 4: Inc to 8-10min stand alexandre/end, with O2 sats 90% or above, to inc safety and I with ADL  Short term goal 5: I BUE HEP  Long term goals  Time Frame for Long term goals : Same as STGs  Long term goal 1: Same as STGs  Long term goal 2: Same as STGs  Patient Goals   Patient goals :  To return home       Therapy Time   Individual Concurrent Group Co-treatment   Time In  11:05am         Time Out  12:00pm         Minutes  [address] [place] [address] Number: [id number]

## 2021-05-07 NOTE — PROGRESS NOTES
reports being able to afford cost of food and medication. Patient reports being an active  and has a PCP as well as Pulmonologist.  SS reviewed PT/OT DC recommendations from evaluation. Patient agreeable with Opal Quevedo upon DC. [place] of choice provided. Patient identifies [facility] as agency of choice. This  contacted [address] [place] [address] with new referral.  Patient reports feeling well supported at home by spouse and identifies no further help at home or DC needs. DONTE completed. SS to continue to follow as needed while patient is at [id number] [address] [facility].     Electronically signed by AIYANA Harper on 5/7/2021 at 1:15 PM

## 2021-05-07 NOTE — DISCHARGE INSTR - COC
Continuity of Care Form    Patient Name: Joan Zavala   :  1935  MRN:  [id number]    Admit date:  2021  Discharge date:  2021    Code Status Order: Full Code   Advance Directives:   [facility] Documentation       Date/Time Healthcare Directive Type of Healthcare Directive Copy in [facility] [address] Agent's Name Healthcare Agent's Phone Number    21 1527  No, patient does not have an advance directive for healthcare treatment -- -- -- -- --            Admitting Physician:  Dulce Maria Akers MD  PCP: eJnnifer Claire MD    Discharging Nurse: [facility] Unit/Room#: 0224/0224-01  Discharging Unit Phone Number: [phone]    Emergency Contact:   Extended Emergency Contact Information  Primary Emergency Contact: Kyler Vick  Address: [facility], [address] [place] [address] Phone: [phone]  Work Phone: [phone]  Mobile Phone: [phone]  Relation: Spouse  Secondary Emergency Contact: [address]   Mobile Phone: [phone]  Relation: Child    Past Surgical History:  Past Surgical History:   Procedure Laterality Date    CAROTID STENT PLACEMENT         Immunization History: There is no immunization history on file for this patient.     Active Problems:  Patient Active Problem List   Diagnosis Code    Coronary artery disease involving native coronary artery I25.10    Shortness of breath R06.02    Abnormal PFT R94.2    Hypoxia R09.02    Chronic obstructive pulmonary disease (HCC) J44.9    Pulmonary fibrosis ([facility] [address] J84.10    Asbestos exposure Z77.090    Panlobular emphysema ([facility] [address] J43.1    COPD exacerbation ([facility] [address] J44.1       Isolation/Infection:   Isolation            No Isolation          Patient Infection Status       Infection Onset Added Last Indicated Last Indicated By Review Planned Expiration Resolved Resolved By    None active    Resolved    COVID-19 Rule Out 21 RN on 5/8/21 at 10:56 AM EDT    CASE MANAGEMENT/SOCIAL WORK SECTION    Inpatient Status Date: 5/5/21    Readmission Risk Assessment Score:  Readmission Risk              Risk of Unplanned Readmission:        13           Discharging to Facility/ Agency   · Name: [facility]   · Address: [address] [facility]  · Phone: [phone]  · Fax: [phone]    / signature: Electronically signed by AIYANA Montesinos on 5/7/2021 at 1:38 PM      PHYSICIAN SECTION    Prognosis: Good    Condition at Discharge: Stable    Rehab Potential (if transferring to Rehab): Good    Recommended Labs or Other Treatments After Discharge:     Physician Certification: I certify the above information and transfer of Tobi Porras  is necessary for the continuing treatment of the diagnosis listed and that he requires Home Care for greater 30 days.      Update Admission H&P: No change in H&P    PHYSICIAN SIGNATURE:  Electronically signed by Sierra Monae MD on 5/8/21 at 8:45 AM EDT

## 2021-05-07 NOTE — PROGRESS NOTES
Patient awake in bed this morning. Reports that he is feeling better today. No other complaints at this time. Medications provided and assessment completed. Call light and belongings within reach.

## 2021-05-08 VITALS
RESPIRATION RATE: 18 BRPM | HEIGHT: 72 IN | BODY MASS INDEX: 23.03 KG/M2 | DIASTOLIC BLOOD PRESSURE: 63 MMHG | WEIGHT: 170 LBS | SYSTOLIC BLOOD PRESSURE: 150 MMHG | OXYGEN SATURATION: 97 % | TEMPERATURE: 97.3 F | HEART RATE: 82 BPM

## 2021-05-08 PROCEDURE — 6370000000 HC RX 637 (ALT 250 FOR IP): Performed by: INTERNAL MEDICINE

## 2021-05-08 PROCEDURE — 6360000002 HC RX W HCPCS: Performed by: INTERNAL MEDICINE

## 2021-05-08 PROCEDURE — 94640 AIRWAY INHALATION TREATMENT: CPT

## 2021-05-08 PROCEDURE — 2580000003 HC RX 258: Performed by: INTERNAL MEDICINE

## 2021-05-08 PROCEDURE — 2580000003 HC RX 258: Performed by: EMERGENCY MEDICINE

## 2021-05-08 RX ORDER — LEVOFLOXACIN 500 MG/1
500 TABLET, FILM COATED ORAL DAILY
Qty: 7 TABLET | Refills: 0 | Status: SHIPPED | OUTPATIENT
Start: 2021-05-08 | End: 2021-05-15

## 2021-05-08 RX ORDER — PREDNISONE 20 MG/1
20 TABLET ORAL DAILY
Qty: 5 TABLET | Refills: 0 | Status: SHIPPED | OUTPATIENT
Start: 2021-05-08 | End: 2021-05-13

## 2021-05-08 RX ORDER — GUAIFENESIN 600 MG/1
600 TABLET, EXTENDED RELEASE ORAL 2 TIMES DAILY
Qty: 20 TABLET | Refills: 0 | Status: SHIPPED | OUTPATIENT
Start: 2021-05-08 | End: 2021-05-18

## 2021-05-08 RX ADMIN — METHYLPREDNISOLONE SODIUM SUCCINATE 40 MG: 40 INJECTION, POWDER, FOR SOLUTION INTRAMUSCULAR; INTRAVENOUS at 06:20

## 2021-05-08 RX ADMIN — METHYLPREDNISOLONE SODIUM SUCCINATE 40 MG: 40 INJECTION, POWDER, FOR SOLUTION INTRAMUSCULAR; INTRAVENOUS at 11:24

## 2021-05-08 RX ADMIN — LEVOTHYROXINE SODIUM 25 MCG: 0.03 TABLET ORAL at 06:20

## 2021-05-08 RX ADMIN — DILTIAZEM HYDROCHLORIDE 180 MG: 180 CAPSULE, COATED, EXTENDED RELEASE ORAL at 08:34

## 2021-05-08 RX ADMIN — POLYETHYLENE GLYCOL 3350 17 G: 17 POWDER, FOR SOLUTION ORAL at 08:39

## 2021-05-08 RX ADMIN — Medication 3 ML: at 11:42

## 2021-05-08 RX ADMIN — LEVOFLOXACIN 500 MG: 5 INJECTION, SOLUTION INTRAVENOUS at 12:15

## 2021-05-08 RX ADMIN — IPRATROPIUM BROMIDE AND ALBUTEROL SULFATE 1 AMPULE: .5; 2.5 SOLUTION RESPIRATORY (INHALATION) at 06:08

## 2021-05-08 RX ADMIN — Medication 10 ML: at 08:34

## 2021-05-08 RX ADMIN — GUAIFENESIN 600 MG: 600 TABLET, EXTENDED RELEASE ORAL at 08:33

## 2021-05-08 RX ADMIN — IPRATROPIUM BROMIDE AND ALBUTEROL SULFATE 1 AMPULE: .5; 2.5 SOLUTION RESPIRATORY (INHALATION) at 11:40

## 2021-05-08 RX ADMIN — LOSARTAN POTASSIUM 25 MG: 25 TABLET, FILM COATED ORAL at 08:33

## 2021-05-08 ASSESSMENT — PAIN SCALES - GENERAL: PAINLEVEL_OUTOF10: 0

## 2021-05-08 NOTE — DISCHARGE SUMMARY
Physical Exam:    Vitals:  Vitals:    05/07/21 1851 05/08/21 0018 05/08/21 0633 05/08/21 0636   BP: 132/60  (!) 156/70 (!) 150/63   Pulse: 90  85 82   Resp:       Temp: 97.5 °F (36.4 °C)  97.3 °F (36.3 °C)    TempSrc:       SpO2: 98% 97% 97%    Weight:       Height:         Weight: Weight: 170 lb (77.1 kg)     24 hour intake/output:    Intake/Output Summary (Last 24 hours) at 5/8/2021 0847  Last data filed at 5/8/2021 0830  Gross per 24 hour   Intake 480 ml   Output --   Net 480 ml       General appearance - alert, well appearing, and in no distress  Chest - clear to auscultation, no wheezes, rales or rhonchi, symmetric air entry  Heart - normal rate, regular rhythm, normal S1, S2, no murmurs, rubs, clicks or gallops  Abdomen - soft, nontender, nondistended, no masses or organomegaly  Obese: No; Protuberant: No   Neurological - alert, oriented, normal speech, no focal findings or movement disorder noted  Extremities - peripheral pulses normal, no pedal edema, no clubbing or cyanosis  Skin - normal coloration and turgor, no rashes, no suspicious skin lesions noted        Radiology reports as per the Radiologist  Radiology: Xr Chest Portable    Result Date: 5/5/2021  EXAMINATION: XR CHEST PORTABLE CLINICAL HISTORY: SHORTNESS OF BREATH. FIBROSIS AND COPD. COMPARISONS: HIGH-RESOLUTION CT CHEST AUGUST 27, 2020, JUNE 13, 2019 CHEST RADIOGRAPH, JULY 11, 2018 FINDINGS: Narrowing, bilateral glenohumeral joints. Cardiopericardial silhouette normal. Aorta calcified. Lungs hyperexpanded with relative lucency upper lung zones and crowding of vascular markings and reticular change at lung bases bilaterally. .     Findings compatible with emphysema with by basilar vascular crowding and interstitial lung change. However, cannot exclude superimposed bibasilar atelectasis/pneumonia.        Results for orders placed or performed during the hospital encounter of 05/05/21   Culture, Blood 1    Specimen: Blood   Result Value Ref Range    Blood Culture, Routine       No Growth to date. Any change in status will be called. Culture, Blood 2    Specimen: Blood   Result Value Ref Range    Culture, Blood 2       No Growth to date. Any change in status will be called.    COVID-19, Rapid    Specimen: Nasopharyngeal Swab   Result Value Ref Range    SARS-CoV-2, NAAT Not Detected Not Detected   Comprehensive Metabolic Panel   Result Value Ref Range    Sodium 142 135 - 144 mEq/L    Potassium 4.3 3.4 - 4.9 mEq/L    Chloride 105 95 - 107 mEq/L    CO2 25 20 - 31 mEq/L    Anion Gap 12 9 - 15 mEq/L    Glucose 144 (H) 70 - 99 mg/dL    BUN 19 8 - 23 mg/dL    CREATININE 1.32 (H) 0.70 - 1.20 mg/dL    GFR Non- 51.4 (L) >60    GFR  >60.0 >60    Calcium 10.4 (H) 8.5 - 9.9 mg/dL    Total Protein 8.7 (H) 6.3 - 8.0 g/dL    Albumin 4.1 3.5 - 4.6 g/dL    Total Bilirubin 0.5 0.2 - 0.7 mg/dL    Alkaline Phosphatase 120 (H) 35 - 104 U/L    ALT 21 0 - 41 U/L    AST 21 0 - 40 U/L    Globulin 4.6 (H) 2.3 - 3.5 g/dL   CBC Auto Differential   Result Value Ref Range    WBC 11.8 (H) 4.8 - 10.8 K/uL    RBC 4.51 (L) 4.70 - 6.10 M/uL    Hemoglobin 13.5 (L) 14.0 - 18.0 g/dL    Hematocrit 40.2 (L) 42.0 - 52.0 %    MCV 89.0 80.0 - 100.0 fL    MCH 29.9 27.0 - 31.3 pg    MCHC 33.6 33.0 - 37.0 %    RDW 14.5 11.5 - 14.5 %    Platelets 567 584 - 663 K/uL    Neutrophils % 83.1 %    Lymphocytes % 7.2 %    Monocytes % 6.9 %    Eosinophils % 2.4 %    Basophils % 0.4 %    Neutrophils Absolute 9.8 (H) 1.4 - 6.5 K/uL    Lymphocytes Absolute 0.8 (L) 1.0 - 4.8 K/uL    Monocytes Absolute 0.8 0.2 - 0.8 K/uL    Eosinophils Absolute 0.3 0.0 - 0.7 K/uL    Basophils Absolute 0.0 0.0 - 0.2 K/uL   Magnesium   Result Value Ref Range    Magnesium 2.1 1.7 - 2.4 mg/dL   Troponin   Result Value Ref Range    Troponin <0.010 0.000 - 0.010 ng/mL   Brain Natriuretic Peptide   Result Value Ref Range    Pro- pg/mL   Protime-INR   Result Value Ref Range    Protime 14.0 12.3 - 14.9 sec    INR 1.1    Lactic Acid, Plasma   Result Value Ref Range    Lactic Acid 1.1 0.5 - 2.2 mmol/L   CBC   Result Value Ref Range    WBC 8.8 4.8 - 10.8 K/uL    RBC 3.92 (L) 4.70 - 6.10 M/uL    Hemoglobin 11.7 (L) 14.0 - 18.0 g/dL    Hematocrit 35.1 (L) 42.0 - 52.0 %    MCV 89.8 80.0 - 100.0 fL    MCH 29.8 27.0 - 31.3 pg    MCHC 33.3 33.0 - 37.0 %    RDW 14.1 11.5 - 14.5 %    Platelets 735 107 - 957 K/uL   Basic Metabolic Panel w/ Reflex to MG   Result Value Ref Range    Sodium 139 135 - 144 mEq/L    Potassium reflex Magnesium 5.0 (H) 3.4 - 4.9 mEq/L    Chloride 107 95 - 107 mEq/L    CO2 24 20 - 31 mEq/L    Anion Gap 8 (L) 9 - 15 mEq/L    Glucose 157 (H) 70 - 99 mg/dL    BUN 30 (H) 8 - 23 mg/dL    CREATININE 1.50 (H) 0.70 - 1.20 mg/dL    GFR Non-African American 44.4 (L) >60    GFR  53.7 (L) >60    Calcium 10.0 (H) 8.5 - 9.9 mg/dL   Hemoglobin A1C   Result Value Ref Range    Hemoglobin A1C 5.7 4.8 - 5.9 %   Basic Metabolic Panel   Result Value Ref Range    Sodium 141 135 - 144 mEq/L    Potassium 4.7 3.4 - 4.9 mEq/L    Chloride 107 95 - 107 mEq/L    CO2 22 20 - 31 mEq/L    Anion Gap 12 9 - 15 mEq/L    Glucose 164 (H) 70 - 99 mg/dL    BUN 44 (H) 8 - 23 mg/dL    CREATININE 1.75 (H) 0.70 - 1.20 mg/dL    GFR Non-African American 37.1 (L) >60    GFR  44.9 (L) >60    Calcium 10.0 (H) 8.5 - 9.9 mg/dL   EKG 12 Lead - Chest Pain   Result Value Ref Range    Ventricular Rate 84 BPM    Atrial Rate 84 BPM    P-R Interval 160 ms    QRS Duration 112 ms    Q-T Interval 364 ms    QTc Calculation (Bazett) 430 ms    P Axis 46 degrees    R Axis -41 degrees    T Axis 3 degrees       Diet:  DIET GENERAL;     Activity:  Activity as tolerated (Patient may move about with assist as indicated or with supervision.)    Follow-up:  in 1 weeks with Krystal Leigh MD,     Disposition: home    Condition: Stable    Time Spent: 60 minutes    Electronically signed by Kelly Haider MD on 5/8/2021 at 8:47 AM    Discharging Hospitalist

## 2021-05-08 NOTE — PROGRESS NOTES
AVS printed and explained to pt and wife. This nurse answered all questions and pt and wife stated understanding of AVS. Belongings gathered by family.

## 2021-05-10 ENCOUNTER — CARE COORDINATION (OUTPATIENT)
Dept: CASE MANAGEMENT | Age: 86
End: 2021-05-10

## 2021-05-10 DIAGNOSIS — J44.1 COPD EXACERBATION (HCC): Primary | ICD-10-CM

## 2021-05-10 LAB
BLOOD CULTURE, ROUTINE: NORMAL
CULTURE, BLOOD 2: NORMAL

## 2021-05-10 PROCEDURE — 1111F DSCHRG MED/CURRENT MED MERGE: CPT | Performed by: INTERNAL MEDICINE

## 2021-05-10 NOTE — CARE COORDINATION
patient including: When to call 911. The patient agrees to contact the PCP office for questions related to their healthcare. Medication reconciliation was performed with patient, who verbalizes understanding of administration of home medications. Advised obtaining a 90-day supply of all daily and as-needed medications. Discussed follow-up appointments. If no appointment was previously scheduled, appointment scheduling offered: Pt reports PCP cristian 5/13 and Dr Delma Escalante 5/21 11:15. . Is follow up appointment scheduled within 7 days of discharge? Yes  Non-[facility] follow up appointment(s):     CTN provided contact information for future needs. Non-[facility] PCP. Current [facility]. Denies any home needs. Reviewed s/s COPD exacerbation to report to PCP/pulmonary, v/u. CTN s/o.       Care Transitions 24 Hour Call    Do you have any ongoing symptoms?: Yes  Patient-reported symptoms: Shortness of Breath  Do you have a copy of your discharge instructions?: Yes  Do you have all of your prescriptions and are they filled?: Yes  Have you been contacted by a [facility] Pharmacist?: No  Have you scheduled your follow up appointment?: Yes  Were you discharged with any Home Care or Post Acute Services: Yes  Post Acute Services: Home Health (Comment: [facility])  Do you feel like you have everything you need to keep you well at home?: Yes  Care Transitions Interventions         Follow Up  Future Appointments   Date Time Provider Kaden Arzola   5/21/2021 11:15 AM Merary Roa MD [address] [place] [address]

## 2021-05-21 ENCOUNTER — OFFICE VISIT (OUTPATIENT)
Dept: PULMONOLOGY | Age: 86
End: 2021-05-21
Payer: MEDICARE

## 2021-05-21 VITALS
OXYGEN SATURATION: 96 % | HEART RATE: 83 BPM | WEIGHT: 171 LBS | SYSTOLIC BLOOD PRESSURE: 139 MMHG | DIASTOLIC BLOOD PRESSURE: 80 MMHG | BODY MASS INDEX: 23.16 KG/M2 | TEMPERATURE: 98 F | HEIGHT: 72 IN

## 2021-05-21 DIAGNOSIS — J18.9 PNEUMONIA OF BOTH LUNGS DUE TO INFECTIOUS ORGANISM, UNSPECIFIED PART OF LUNG: ICD-10-CM

## 2021-05-21 DIAGNOSIS — R06.02 SHORTNESS OF BREATH: ICD-10-CM

## 2021-05-21 DIAGNOSIS — J43.1 PANLOBULAR EMPHYSEMA (HCC): Primary | ICD-10-CM

## 2021-05-21 DIAGNOSIS — R09.02 HYPOXIA: ICD-10-CM

## 2021-05-21 DIAGNOSIS — J84.10 PULMONARY FIBROSIS (HCC): ICD-10-CM

## 2021-05-21 PROCEDURE — G8420 CALC BMI NORM PARAMETERS: HCPCS | Performed by: INTERNAL MEDICINE

## 2021-05-21 PROCEDURE — G8427 DOCREV CUR MEDS BY ELIG CLIN: HCPCS | Performed by: INTERNAL MEDICINE

## 2021-05-21 PROCEDURE — G8926 SPIRO NO PERF OR DOC: HCPCS | Performed by: INTERNAL MEDICINE

## 2021-05-21 PROCEDURE — 3023F SPIROM DOC REV: CPT | Performed by: INTERNAL MEDICINE

## 2021-05-21 PROCEDURE — 1123F ACP DISCUSS/DSCN MKR DOCD: CPT | Performed by: INTERNAL MEDICINE

## 2021-05-21 PROCEDURE — 99214 OFFICE O/P EST MOD 30 MIN: CPT | Performed by: INTERNAL MEDICINE

## 2021-05-21 PROCEDURE — 1036F TOBACCO NON-USER: CPT | Performed by: INTERNAL MEDICINE

## 2021-05-21 PROCEDURE — 1111F DSCHRG MED/CURRENT MED MERGE: CPT | Performed by: INTERNAL MEDICINE

## 2021-05-21 PROCEDURE — 4040F PNEUMOC VAC/ADMIN/RCVD: CPT | Performed by: INTERNAL MEDICINE

## 2021-05-21 RX ORDER — ALBUTEROL SULFATE 90 UG/1
2 AEROSOL, METERED RESPIRATORY (INHALATION) 4 TIMES DAILY PRN
Qty: 1 INHALER | Refills: 0 | Status: SHIPPED | OUTPATIENT
Start: 2021-05-21 | End: 2021-11-05 | Stop reason: SDUPTHER

## 2021-05-21 ASSESSMENT — ENCOUNTER SYMPTOMS
VOMITING: 0
ABDOMINAL PAIN: 0
SHORTNESS OF BREATH: 1
RHINORRHEA: 0
SORE THROAT: 0
EYE ITCHING: 0
NAUSEA: 0
WHEEZING: 0
CHEST TIGHTNESS: 0
COUGH: 0
DIARRHEA: 0
VOICE CHANGE: 0

## 2021-05-21 NOTE — PROGRESS NOTES
Subjective:     Lea Jc is a 80 y.o. male who complains today of:     Chief Complaint   Patient presents with    Follow-Up from Hospital     s/p pneumonia    COPD       HPI  He was in hospital and went home 5/8/21 after being treated for pneumonia and COPD exacerbation. He was sent home with prednisone and Levaquin    He is currently on 3 lit 24 hour a day. He is not using any inhaler. C/o shortness of breath with exertion. No  Wheezing   C/o Cough with clear   Sputum  No Hemoptysis  No Chest tightness   No Chest pain with radiation  or pleuritic pain  No  leg edema   No orthopnea  No Fever or chills. No Rhinorrhea and postnasal drip. He want to try albuterol HFA and want to try spacer. Allergies:  Patient has no known allergies.   Past Medical History:   Diagnosis Date    Arthritis     CAD (coronary artery disease)     Hyperlipidemia     Hypertension     Lung disease      Past Surgical History:   Procedure Laterality Date    CAROTID STENT PLACEMENT       Family History   Problem Relation Age of Onset    Cancer Father     Stroke Father      Social History     Socioeconomic History    Marital status:      Spouse name: Not on file    Number of children: Not on file    Years of education: Not on file    Highest education level: Not on file   Occupational History    Not on file   Tobacco Use    Smoking status: Former Smoker    Smokeless tobacco: Never Used    Tobacco comment: quit smoking 1981   Vaping Use    Vaping Use: Never used   Substance and Sexual Activity    Alcohol use: No    Drug use: No    Sexual activity: Not Currently     Partners: Female   Other Topics Concern    Not on file   Social History Narrative    Not on file     Social Determinants of Health     Financial Resource Strain:     Difficulty of Paying Living Expenses:    Food Insecurity:     Worried About Running Out of Food in the Last Year:     [address] in the Last Year:    Transportation Needs:     Lack of Transportation (Medical):  Lack of Transportation (Non-Medical):    Physical Activity:     Days of Exercise per Week:     Minutes of Exercise per Session:    Stress:     Feeling of Stress :    Social Connections:     Frequency of Communication with Friends and Family:     Frequency of Social Gatherings with Friends and Family:     Attends Faith Services:     Active Member of Clubs or Organizations:     Attends Club or Organization Meetings:     Marital Status:    Intimate Partner Violence:     Fear of Current or Ex-Partner:     Emotionally Abused:     Physically Abused:     Sexually Abused:          Review of Systems   Constitutional: Negative for chills, diaphoresis, fatigue and fever. HENT: Negative for congestion, mouth sores, nosebleeds, postnasal drip, rhinorrhea, sneezing, sore throat and voice change. Eyes: Negative for itching and visual disturbance. Respiratory: Positive for shortness of breath. Negative for cough, chest tightness and wheezing. Cardiovascular: Negative. Negative for chest pain, palpitations and leg swelling. Gastrointestinal: Negative for abdominal pain, diarrhea, nausea and vomiting. Genitourinary: Negative for difficulty urinating and hematuria. Musculoskeletal: Negative for arthralgias, joint swelling and myalgias. Skin: Negative for rash. Allergic/Immunologic: Negative for environmental allergies. Neurological: Negative for dizziness, tremors, weakness and headaches. Psychiatric/Behavioral: Negative for behavioral problems and sleep disturbance.         :     Vitals:    05/21/21 1126   BP: 139/80   Pulse: 83   Temp: 98 °F (36.7 °C)   SpO2: 96%   Weight: 171 lb (77.6 kg)   Height: 6' (1.829 m)     Wt Readings from Last 3 Encounters:   05/21/21 171 lb (77.6 kg)   05/05/21 170 lb (77.1 kg)   08/31/20 170 lb (77.1 kg)         Physical Exam  Constitutional:       Appearance: He is well-developed.    HENT:      Head: Normocephalic and atraumatic. Nose: Nose normal.   Eyes:      Conjunctiva/sclera: Conjunctivae normal.      Pupils: Pupils are equal, round, and reactive to light. Neck:      Thyroid: No thyromegaly. Vascular: No JVD. Trachea: No tracheal deviation. Cardiovascular:      Rate and Rhythm: Normal rate and regular rhythm. Heart sounds: No murmur heard. No friction rub. No gallop. Pulmonary:      Effort: Pulmonary effort is normal. No respiratory distress. Breath sounds: Rales (bibasilar) present. No wheezing. Comments: diminished Breath sound bilaterally. Chest:      Chest wall: No tenderness. Abdominal:      General: There is no distension. Musculoskeletal:         General: Normal range of motion. Lymphadenopathy:      Cervical: No cervical adenopathy. Skin:     General: Skin is warm and dry. Findings: No rash. Neurological:      Mental Status: He is alert and oriented to person, place, and time. Cranial Nerves: No cranial nerve deficit. Psychiatric:         Behavior: Behavior normal.         Current Outpatient Medications   Medication Sig Dispense Refill    albuterol sulfate HFA (VENTOLIN HFA) 108 (90 Base) MCG/ACT inhaler Inhale 2 puffs into the lungs 4 times daily as needed for Wheezing 1 Inhaler 0    levothyroxine (SYNTHROID) 25 MCG tablet take 1 tablet by mouth every morning ON AN EMPTY STOMACH      losartan (COZAAR) 25 MG tablet Take 25 mg by mouth daily      Fexofenadine HCl (ALLEGRA PO) Take by mouth      dilTIAZem (DILACOR XR) 120 MG extended release capsule Take 120 mg by mouth daily      clopidogrel (PLAVIX) 75 MG tablet       atorvastatin (LIPITOR) 40 MG tablet Take 40 mg by mouth daily   0    aspirin 81 MG chewable tablet Take 81 mg by mouth daily      nitroGLYCERIN (NITROSTAT) 0.4 MG SL tablet up to max of 3 total doses.  If no relief after 1 dose, call 911. 25 tablet 3    Multiple Vitamins-Minerals (PRESERVISION AREDS 2) CAPS Take 1 tablet by mouth daily       No current facility-administered medications for this visit. Results for orders placed during the hospital encounter of 07/11/18    XR CHEST STANDARD (2 VW)    Narrative  Chest X-ray, 2 views    Clinical information:  Shortness of breath    Comparison:  November 15, 2012    Findings    Osseous structures intact. Cardiopericardial silhouette is normal. Pulmonary vasculature is normal. Lucent area projects cephalad and posterior to cardiac silhouette. Mild flattening the diaphragms and increased lung volumes. Stranding left lung base. Ill-defined area increased opacity right lung base. Impression    Bibasilar scarring versus subsegmental atelectasis/pneumonia. Emphysema      Results for orders placed during the hospital encounter of 11/15/12    X-ray chest PA and lateral    Narrative  X-RAY A CHEST, 2 VIEWS    CLINICAL HISTORY Cough and short of breath    COMPARISONS None available. FINDINGS  Normal cardiac silhouette. There are atherosclerotic  calcifications in the aortic arch. There is coarsening of interstitial  markings in the mid and lung bases, most pronounced in lung bases. No  consolidating infiltrate. No pleural effusion or pneumothorax. The bony  thorax is unremarkable. IMPRESSION COARSENED INTERSTITIAL MARKINGS. NO CONSOLIDATING INFILTRATE. Shemar Garland By- Althea Og M.D. Released By- Althea Og M.D. Released Date Time- 11/15/12 1119  This document has been electronically signed. ------------------------------------------------------------------------------  ]  Results for orders placed during the hospital encounter of 05/05/21    XR CHEST PORTABLE    Narrative  EXAMINATION: XR CHEST PORTABLE    CLINICAL HISTORY: SHORTNESS OF BREATH. FIBROSIS AND COPD. COMPARISONS: HIGH-RESOLUTION CT CHEST AUGUST 27, 2020, JUNE 13, 2019 CHEST RADIOGRAPH, JULY 11, 2018    FINDINGS: Narrowing, bilateral glenohumeral joints. Cardiopericardial silhouette normal. Aorta calcified. Lungs hyperexpanded with relative lucency upper lung zones and crowding of vascular markings and reticular change at lung bases bilaterally. .    Impression  Findings compatible with emphysema with by basilar vascular crowding and interstitial lung change. However, cannot exclude superimposed bibasilar atelectasis/pneumonia. Assessment/Plan:     1. Panlobular emphysema (HCC)  C/o shortness of breath with exertion. No  Wheezing. C/o Cough with clear  Sputum. He want to try albuterol HFA and want to try spacer. 2. Pulmonary fibrosis ([address]  He had a CT chest done in August 2020 shows severe emphysema interstitial lung disease most notable in the dependent part of lower lobe bilaterally. No CT evidence of asbestos-related disease. Saccular infrarenal abdominal aortic aneurysm 1.7 x 1.5 cm    3. Hypoxia  He is on 2 L O2 with sleep and as needed. Continue O2 to keep saturation 90% above    4. Pneumonia of both lungs due to infectious organism, unspecified part of lung  He was in hospital and went home 5/8/21 after being treated for pneumonia and COPD exacerbation. He was sent home with prednisone and Levaquin. He is currently on 3 lit 24 hour a day. He is not using any inhaler. Return in about 4 months (around 9/21/2021) for shortness of breath, pulmonary fibrosis, hypoxia on O2, COPD.       Yoli Ignacio MD

## 2021-11-05 ENCOUNTER — OFFICE VISIT (OUTPATIENT)
Dept: PULMONOLOGY | Age: 86
End: 2021-11-05
Payer: MEDICARE

## 2021-11-05 VITALS
HEART RATE: 79 BPM | OXYGEN SATURATION: 99 % | DIASTOLIC BLOOD PRESSURE: 81 MMHG | SYSTOLIC BLOOD PRESSURE: 141 MMHG | HEIGHT: 72 IN | WEIGHT: 170 LBS | TEMPERATURE: 98.5 F | BODY MASS INDEX: 23.03 KG/M2

## 2021-11-05 DIAGNOSIS — J96.11 CHRONIC RESPIRATORY FAILURE WITH HYPOXIA (HCC): Primary | ICD-10-CM

## 2021-11-05 DIAGNOSIS — J84.10 PULMONARY FIBROSIS (HCC): ICD-10-CM

## 2021-11-05 DIAGNOSIS — J44.1 CHRONIC OBSTRUCTIVE PULMONARY DISEASE WITH ACUTE EXACERBATION (HCC): ICD-10-CM

## 2021-11-05 PROCEDURE — 99214 OFFICE O/P EST MOD 30 MIN: CPT | Performed by: INTERNAL MEDICINE

## 2021-11-05 PROCEDURE — 1123F ACP DISCUSS/DSCN MKR DOCD: CPT | Performed by: INTERNAL MEDICINE

## 2021-11-05 PROCEDURE — G8926 SPIRO NO PERF OR DOC: HCPCS | Performed by: INTERNAL MEDICINE

## 2021-11-05 PROCEDURE — G8420 CALC BMI NORM PARAMETERS: HCPCS | Performed by: INTERNAL MEDICINE

## 2021-11-05 PROCEDURE — G8427 DOCREV CUR MEDS BY ELIG CLIN: HCPCS | Performed by: INTERNAL MEDICINE

## 2021-11-05 PROCEDURE — 1036F TOBACCO NON-USER: CPT | Performed by: INTERNAL MEDICINE

## 2021-11-05 PROCEDURE — 3023F SPIROM DOC REV: CPT | Performed by: INTERNAL MEDICINE

## 2021-11-05 PROCEDURE — G8484 FLU IMMUNIZE NO ADMIN: HCPCS | Performed by: INTERNAL MEDICINE

## 2021-11-05 PROCEDURE — 4040F PNEUMOC VAC/ADMIN/RCVD: CPT | Performed by: INTERNAL MEDICINE

## 2021-11-05 RX ORDER — ALBUTEROL SULFATE 90 UG/1
2 AEROSOL, METERED RESPIRATORY (INHALATION) 4 TIMES DAILY PRN
Qty: 1 EACH | Refills: 3 | Status: SHIPPED | OUTPATIENT
Start: 2021-11-05

## 2021-11-05 ASSESSMENT — ENCOUNTER SYMPTOMS
SHORTNESS OF BREATH: 1
EYE ITCHING: 0
RHINORRHEA: 0
DIARRHEA: 0
NAUSEA: 0
VOICE CHANGE: 0
WHEEZING: 0
ABDOMINAL PAIN: 0
CHEST TIGHTNESS: 0
SORE THROAT: 0
COUGH: 0
VOMITING: 0

## 2021-11-05 NOTE — PROGRESS NOTES
Subjective:     Danelle Talley is a 80 y.o. male who complains today of:     Chief Complaint   Patient presents with    Breathing Problem     4 month f/u       HPI  He is currently on 3 lit 24 hour a day. He is on  albuterol HFA  2 puff  With spacer and it is helping . Emily Zuniga C/o shortness of breath with exertion. No  Wheezing   No C/o Cough   No Hemoptysis  No Chest tightness   No Chest pain with radiation  or pleuritic pain  No  leg edema   No orthopnea  No Fever or chills. No Rhinorrhea and postnasal drip. PFT show mild COPD, DLCO severely impaired. Allergies:  Patient has no known allergies. Past Medical History:   Diagnosis Date    Arthritis     CAD (coronary artery disease)     Hyperlipidemia     Hypertension     Lung disease      Past Surgical History:   Procedure Laterality Date    CAROTID STENT PLACEMENT       Family History   Problem Relation Age of Onset    Cancer Father     Stroke Father      Social History     Socioeconomic History    Marital status:      Spouse name: Not on file    Number of children: Not on file    Years of education: Not on file    Highest education level: Not on file   Occupational History    Not on file   Tobacco Use    Smoking status: Former Smoker    Smokeless tobacco: Never Used    Tobacco comment: quit smoking 1981   Vaping Use    Vaping Use: Never used   Substance and Sexual Activity    Alcohol use: No    Drug use: No    Sexual activity: Not Currently     Partners: Female   Other Topics Concern    Not on file   Social History Narrative    Not on file     Social Determinants of Health     Financial Resource Strain:     Difficulty of Paying Living Expenses:    Food Insecurity:     Worried About Running Out of Food in the Last Year:     [address] in the Last Year:    Transportation Needs:     Lack of Transportation (Medical):      Lack of Transportation (Non-Medical):    Physical Activity:     Days of Exercise per Week:     Minutes of Exercise per Session:    Stress:     Feeling of Stress :    Social Connections:     Frequency of Communication with Friends and Family:     Frequency of Social Gatherings with Friends and Family:     Attends Taoism Services:     Active Member of Clubs or Organizations:     Attends Club or Organization Meetings:     Marital Status:    Intimate Partner Violence:     Fear of Current or Ex-Partner:     Emotionally Abused:     Physically Abused:     Sexually Abused:          Review of Systems   Constitutional: Negative for chills, diaphoresis, fatigue and fever. HENT: Negative for congestion, mouth sores, nosebleeds, postnasal drip, rhinorrhea, sneezing, sore throat and voice change. Eyes: Negative for itching and visual disturbance. Respiratory: Positive for shortness of breath. Negative for cough, chest tightness and wheezing. Cardiovascular: Negative. Negative for chest pain, palpitations and leg swelling. Gastrointestinal: Negative for abdominal pain, diarrhea, nausea and vomiting. Genitourinary: Negative for difficulty urinating and hematuria. Musculoskeletal: Negative for arthralgias, joint swelling and myalgias. Skin: Negative for rash. Allergic/Immunologic: Negative for environmental allergies. Neurological: Negative for dizziness, tremors, weakness and headaches. Psychiatric/Behavioral: Negative for behavioral problems and sleep disturbance.         :     Vitals:    11/05/21 1152 11/05/21 1156   BP: (!) 160/87 (!) 141/81   Site: Right Upper Arm Right Upper Arm   Position: Sitting Sitting   Cuff Size: Large Adult Large Adult   Pulse: 79    Temp: 98.5 °F (36.9 °C)    SpO2: 99%    Weight: 170 lb (77.1 kg)    Height: 6' (1.829 m)      Wt Readings from Last 3 Encounters:   11/05/21 170 lb (77.1 kg)   05/21/21 171 lb (77.6 kg)   05/05/21 170 lb (77.1 kg)         Physical Exam  Constitutional:       Appearance: He is well-developed.    HENT:      Head: Normocephalic and atraumatic. Nose: Nose normal.   Eyes:      Conjunctiva/sclera: Conjunctivae normal.      Pupils: Pupils are equal, round, and reactive to light. Neck:      Thyroid: No thyromegaly. Vascular: No JVD. Trachea: No tracheal deviation. Cardiovascular:      Rate and Rhythm: Normal rate and regular rhythm. Heart sounds: No murmur heard. No friction rub. No gallop. Pulmonary:      Effort: Pulmonary effort is normal. No respiratory distress. Breath sounds: Rales (bibasilar) present. No wheezing. Comments: diminished Breath sound bilaterally. Chest:      Chest wall: No tenderness. Abdominal:      General: There is no distension. Musculoskeletal:         General: Normal range of motion. Lymphadenopathy:      Cervical: No cervical adenopathy. Skin:     General: Skin is warm and dry. Findings: No rash. Neurological:      Mental Status: He is alert and oriented to person, place, and time. Cranial Nerves: No cranial nerve deficit. Psychiatric:         Behavior: Behavior normal.         Current Outpatient Medications   Medication Sig Dispense Refill    albuterol sulfate HFA (VENTOLIN HFA) 108 (90 Base) MCG/ACT inhaler Inhale 2 puffs into the lungs 4 times daily as needed for Wheezing 1 each 3    levothyroxine (SYNTHROID) 25 MCG tablet take 1 tablet by mouth every morning ON AN EMPTY STOMACH      losartan (COZAAR) 25 MG tablet Take 25 mg by mouth daily      Fexofenadine HCl (ALLEGRA PO) Take by mouth      dilTIAZem (DILACOR XR) 120 MG extended release capsule Take 120 mg by mouth daily      clopidogrel (PLAVIX) 75 MG tablet       atorvastatin (LIPITOR) 40 MG tablet Take 40 mg by mouth daily   0    aspirin 81 MG chewable tablet Take 81 mg by mouth daily      nitroGLYCERIN (NITROSTAT) 0.4 MG SL tablet up to max of 3 total doses.  If no relief after 1 dose, call 911. 25 tablet 3    Multiple Vitamins-Minerals (PRESERVISION AREDS 2) CAPS Take 1 tablet by mouth daily       No current facility-administered medications for this visit. Results for orders placed during the hospital encounter of 07/11/18    XR CHEST STANDARD (2 VW)    Narrative  Chest X-ray, 2 views    Clinical information:  Shortness of breath    Comparison:  November 15, 2012    Findings    Osseous structures intact. Cardiopericardial silhouette is normal. Pulmonary vasculature is normal. Lucent area projects cephalad and posterior to cardiac silhouette. Mild flattening the diaphragms and increased lung volumes. Stranding left lung base. Ill-defined area increased opacity right lung base. Impression    Bibasilar scarring versus subsegmental atelectasis/pneumonia. Emphysema      Results for orders placed during the hospital encounter of 11/15/12    X-ray chest PA and lateral    Narrative  X-RAY A CHEST, 2 VIEWS    CLINICAL HISTORY Cough and short of breath    COMPARISONS None available. FINDINGS  Normal cardiac silhouette. There are atherosclerotic  calcifications in the aortic arch. There is coarsening of interstitial  markings in the mid and lung bases, most pronounced in lung bases. No  consolidating infiltrate. No pleural effusion or pneumothorax. The bony  thorax is unremarkable. IMPRESSION COARSENED INTERSTITIAL MARKINGS. NO CONSOLIDATING INFILTRATE. [place] Shonda By- Alon Doe M.D. Released By- Alon Doe M.D. Released Date Time- 11/15/12 1119  This document has been electronically signed. ------------------------------------------------------------------------------  ]  Results for orders placed during the hospital encounter of 05/05/21    XR CHEST PORTABLE    Narrative  EXAMINATION: XR CHEST PORTABLE    CLINICAL HISTORY: SHORTNESS OF BREATH. FIBROSIS AND COPD. COMPARISONS: HIGH-RESOLUTION CT CHEST AUGUST 27, 2020, JUNE 13, 2019 CHEST RADIOGRAPH, JULY 11, 2018    FINDINGS: Narrowing, bilateral glenohumeral joints.  Cardiopericardial silhouette normal. Aorta calcified. Lungs hyperexpanded with relative lucency upper lung zones and crowding of vascular markings and reticular change at lung bases bilaterally. .    Impression  Findings compatible with emphysema with by basilar vascular crowding and interstitial lung change. However, cannot exclude superimposed bibasilar atelectasis/pneumonia. Assessment/Plan:     1. Chronic respiratory failure with hypoxia (HCC)  He is currently on 3 lit 24 hour a day. Continue O2 to keep saturation 90% or above. 2. Chronic obstructive pulmonary disease with acute exacerbation ([address]  He is on  albuterol HFA  2 puff  With spacer and it is helping . Lanette Stands C/o shortness of breath with exertion. No  Wheezing No C/o Cough chest x-ray shows emphysema with bibasilar crowding and interstitial lung changes. PFT show mild COPD, DLCO severely impaired. - albuterol sulfate HFA (VENTOLIN HFA) 108 (90 Base) MCG/ACT inhaler; Inhale 2 puffs into the lungs 4 times daily as needed for Wheezing  Dispense: 1 each; Refill: 3    3. Pulmonary fibrosis ([address]  Last CT chest in August 2020 show severe emphysema. Interstitial lung disease independent lower lung bilaterally      Return in about 4 months (around 3/5/2022) for COPD, chronic respiratory failure, pulmonary fibrosis.       Anthony Acosta MD

## 2022-05-02 ENCOUNTER — OFFICE VISIT (OUTPATIENT)
Dept: PULMONOLOGY | Age: 87
End: 2022-05-02
Payer: MEDICARE

## 2022-05-02 VITALS
WEIGHT: 170 LBS | DIASTOLIC BLOOD PRESSURE: 88 MMHG | TEMPERATURE: 98.3 F | HEIGHT: 72 IN | HEART RATE: 82 BPM | BODY MASS INDEX: 23.03 KG/M2 | OXYGEN SATURATION: 95 % | SYSTOLIC BLOOD PRESSURE: 133 MMHG

## 2022-05-02 DIAGNOSIS — J44.1 CHRONIC OBSTRUCTIVE PULMONARY DISEASE WITH ACUTE EXACERBATION (HCC): ICD-10-CM

## 2022-05-02 DIAGNOSIS — J84.10 PULMONARY FIBROSIS (HCC): ICD-10-CM

## 2022-05-02 DIAGNOSIS — J96.11 CHRONIC RESPIRATORY FAILURE WITH HYPOXIA (HCC): Primary | ICD-10-CM

## 2022-05-02 PROCEDURE — G8427 DOCREV CUR MEDS BY ELIG CLIN: HCPCS | Performed by: INTERNAL MEDICINE

## 2022-05-02 PROCEDURE — 3023F SPIROM DOC REV: CPT | Performed by: INTERNAL MEDICINE

## 2022-05-02 PROCEDURE — 1123F ACP DISCUSS/DSCN MKR DOCD: CPT | Performed by: INTERNAL MEDICINE

## 2022-05-02 PROCEDURE — G8420 CALC BMI NORM PARAMETERS: HCPCS | Performed by: INTERNAL MEDICINE

## 2022-05-02 PROCEDURE — 99214 OFFICE O/P EST MOD 30 MIN: CPT | Performed by: INTERNAL MEDICINE

## 2022-05-02 PROCEDURE — 1036F TOBACCO NON-USER: CPT | Performed by: INTERNAL MEDICINE

## 2022-05-02 PROCEDURE — 4040F PNEUMOC VAC/ADMIN/RCVD: CPT | Performed by: INTERNAL MEDICINE

## 2022-05-02 ASSESSMENT — ENCOUNTER SYMPTOMS
NAUSEA: 0
WHEEZING: 0
EYE ITCHING: 0
COUGH: 1
SORE THROAT: 0
CHEST TIGHTNESS: 0
VOICE CHANGE: 0
DIARRHEA: 0
ABDOMINAL PAIN: 0
SHORTNESS OF BREATH: 1
RHINORRHEA: 0
VOMITING: 0

## 2022-05-02 NOTE — PROGRESS NOTES
Subjective:     Leila Arshad is a 80 y.o. male who complains today of:     Chief Complaint   Patient presents with    Follow-up     4 month f/u       HPI  He is currently on 3 lit with sleep  And  Activity. He is on albuterol HFA  2 puff With spacer and it is helping. C/o shortness of breath with exertion.   No  Wheezing, C/o Cough  With clear mucus in AM   No Chest tightness   No Chest pain with radiation  or pleuritic pain  No  leg edema   No orthopnea  No Fever or chills. No Rhinorrhea and postnasal drip. PFT in past  show mild COPD, DLCO severely impaired. Allergies:  Patient has no known allergies. Past Medical History:   Diagnosis Date    Arthritis     CAD (coronary artery disease)     Hyperlipidemia     Hypertension     Lung disease      Past Surgical History:   Procedure Laterality Date    CAROTID STENT PLACEMENT       Family History   Problem Relation Age of Onset    Cancer Father     Stroke Father      Social History     Socioeconomic History    Marital status:      Spouse name: Not on file    Number of children: Not on file    Years of education: Not on file    Highest education level: Not on file   Occupational History    Not on file   Tobacco Use    Smoking status: Former Smoker    Smokeless tobacco: Never Used    Tobacco comment: quit smoking 1981   Vaping Use    Vaping Use: Never used   Substance and Sexual Activity    Alcohol use: No    Drug use: No    Sexual activity: Not Currently     Partners: Female   Other Topics Concern    Not on file   Social History Narrative    Not on file     Social Determinants of Health     Financial Resource Strain:     Difficulty of Paying Living Expenses: Not on file   Food Insecurity:     Worried About [address] in the Last Year: Not on file    Bella of Food in the Last Year: Not on file   Transportation Needs:     Lack of Transportation (Medical): Not on file    Lack of Transportation (Non-Medical):  Not on file   Physical Activity:     Days of Exercise per Week: Not on file    Minutes of Exercise per Session: Not on file   Stress:     Feeling of Stress : Not on file   Social Connections:     Frequency of Communication with Friends and Family: Not on file    Frequency of Social Gatherings with Friends and Family: Not on file    Attends [employer] Services: Not on file    Active Member of [address] [place] [address] or Organizations: Not on file    Attends Club or Organization Meetings: Not on file    Marital Status: Not on file   Intimate Partner Violence:     Fear of Current or Ex-Partner: Not on file    Emotionally Abused: Not on file    Physically Abused: Not on file    Sexually Abused: Not on file   Housing Stability:     Unable to Pay for Housing in the Last Year: Not on file    Number of [place] in the Last Year: Not on file    Unstable Housing in the Last Year: Not on file         Review of Systems   Constitutional: Negative for chills, diaphoresis, fatigue and fever. HENT: Negative for congestion, mouth sores, nosebleeds, postnasal drip, rhinorrhea, sneezing, sore throat and voice change. Eyes: Negative for itching and visual disturbance. Respiratory: Positive for cough and shortness of breath. Negative for chest tightness and wheezing. Cardiovascular: Negative. Negative for chest pain, palpitations and leg swelling. Gastrointestinal: Negative for abdominal pain, diarrhea, nausea and vomiting. Genitourinary: Negative for difficulty urinating and hematuria. Musculoskeletal: Negative for arthralgias, joint swelling and myalgias. Skin: Negative for rash. Allergic/Immunologic: Negative for environmental allergies. Neurological: Negative for dizziness, tremors, weakness and headaches.    Psychiatric/Behavioral: Negative for behavioral problems and sleep disturbance.         :     Vitals:    05/02/22 1036   BP: 133/88   Pulse: 82   Temp: 98.3 °F (36.8 °C)   SpO2: 95%   Weight: 170 lb (77.1 kg) Height: 6' (1.829 m)     Wt Readings from Last 3 Encounters:   05/02/22 170 lb (77.1 kg)   11/05/21 170 lb (77.1 kg)   05/21/21 171 lb (77.6 kg)         Physical Exam  Constitutional:       Appearance: He is well-developed. HENT:      Head: Normocephalic and atraumatic. Nose: Nose normal.   Eyes:      Conjunctiva/sclera: Conjunctivae normal.      Pupils: Pupils are equal, round, and reactive to light. Neck:      Thyroid: No thyromegaly. Vascular: No JVD. Trachea: No tracheal deviation. Cardiovascular:      Rate and Rhythm: Normal rate and regular rhythm. Heart sounds: No murmur heard. No friction rub. No gallop. Pulmonary:      Effort: Pulmonary effort is normal. No respiratory distress. Breath sounds: Rales present. No wheezing. Comments: diminished Breath sound bilaterally. Chest:      Chest wall: No tenderness. Abdominal:      General: There is no distension. Musculoskeletal:         General: Normal range of motion. Lymphadenopathy:      Cervical: No cervical adenopathy. Skin:     General: Skin is warm and dry. Findings: No rash. Neurological:      Mental Status: He is alert and oriented to person, place, and time. Cranial Nerves: No cranial nerve deficit.    Psychiatric:         Behavior: Behavior normal.         Current Outpatient Medications   Medication Sig Dispense Refill    albuterol sulfate HFA (VENTOLIN HFA) 108 (90 Base) MCG/ACT inhaler Inhale 2 puffs into the lungs 4 times daily as needed for Wheezing 1 each 3    levothyroxine (SYNTHROID) 25 MCG tablet take 1 tablet by mouth every morning ON AN EMPTY STOMACH      losartan (COZAAR) 25 MG tablet Take 25 mg by mouth daily      Fexofenadine HCl (ALLEGRA PO) Take by mouth      dilTIAZem (DILACOR XR) 120 MG extended release capsule Take 120 mg by mouth daily      clopidogrel (PLAVIX) 75 MG tablet       atorvastatin (LIPITOR) 40 MG tablet Take 40 mg by mouth daily   0    aspirin 81 MG chewable tablet Take 81 mg by mouth daily      nitroGLYCERIN (NITROSTAT) 0.4 MG SL tablet up to max of 3 total doses. If no relief after 1 dose, call 911. 25 tablet 3    Multiple Vitamins-Minerals (PRESERVISION AREDS 2) CAPS Take 1 tablet by mouth daily       No current facility-administered medications for this visit. Results for orders placed during the hospital encounter of 07/11/18    XR CHEST STANDARD (2 VW)    Narrative  Chest X-ray, 2 views    Clinical information:  Shortness of breath    Comparison:  November 15, 2012    Findings    Osseous structures intact. Cardiopericardial silhouette is normal. Pulmonary vasculature is normal. Lucent area projects cephalad and posterior to cardiac silhouette. Mild flattening the diaphragms and increased lung volumes. Stranding left lung base. Ill-defined area increased opacity right lung base. Impression    Bibasilar scarring versus subsegmental atelectasis/pneumonia. Emphysema      Results for orders placed during the hospital encounter of 11/15/12    X-ray chest PA and lateral    Narrative  X-RAY A CHEST, 2 VIEWS    CLINICAL HISTORY Cough and short of breath    COMPARISONS None available. FINDINGS  Normal cardiac silhouette. There are atherosclerotic  calcifications in the aortic arch. There is coarsening of interstitial  markings in the mid and lung bases, most pronounced in lung bases. No  consolidating infiltrate. No pleural effusion or pneumothorax. The bony  thorax is unremarkable. IMPRESSION COARSENED INTERSTITIAL MARKINGS. NO CONSOLIDATING INFILTRATE. Drea Alcantara By- Alexandro Bloch M.D. Released By- Alexandro Bloch M.D. Released Date Time- 11/15/12 1119  This document has been electronically signed.   ------------------------------------------------------------------------------  ]  Results for orders placed during the hospital encounter of 05/05/21    XR CHEST PORTABLE    Narrative  EXAMINATION: XR CHEST PORTABLE    CLINICAL HISTORY: SHORTNESS OF BREATH. FIBROSIS AND COPD. COMPARISONS: HIGH-RESOLUTION CT CHEST 2020, 2019 CHEST RADIOGRAPH, 2018    FINDINGS: Narrowing, bilateral glenohumeral joints. Cardiopericardial silhouette normal. Aorta calcified. Lungs hyperexpanded with relative lucency upper lung zones and crowding of vascular markings and reticular change at lung bases bilaterally. .    Impression  Findings compatible with emphysema with by basilar vascular crowding and interstitial lung change. However, cannot exclude superimposed bibasilar atelectasis/pneumonia. Assessment/Plan:     1. Chronic respiratory failure with hypoxia (HCC)  He is on 3 L O2 with sleep and as needed. Continue O2 to keep saturation 90% above    2. Chronic obstructive pulmonary disease with acute exacerbation (HCC)  C/o shortness of breath with exertion. No  Wheezing. C/o Cough with clear  Sputum. He want to try albuterol HFA with spacer, continue same     3. Pulmonary fibrosis ([address]  CT chest done in 2020 shows severe emphysema interstitial lung disease most notable in the dependent part of lower lobe bilaterally. he does  Not want to have CT chest      Return in about 4 months (around 2022) for COPD, hypoxia on O2.       Edson Apgar, MD

## 2022-11-23 ENCOUNTER — OFFICE VISIT (OUTPATIENT)
Dept: PULMONOLOGY | Age: 87
End: 2022-11-23
Payer: MEDICARE

## 2022-11-23 VITALS
HEART RATE: 94 BPM | DIASTOLIC BLOOD PRESSURE: 80 MMHG | SYSTOLIC BLOOD PRESSURE: 118 MMHG | BODY MASS INDEX: 24.14 KG/M2 | OXYGEN SATURATION: 94 % | WEIGHT: 178 LBS

## 2022-11-23 DIAGNOSIS — J44.1 CHRONIC OBSTRUCTIVE PULMONARY DISEASE WITH ACUTE EXACERBATION (HCC): ICD-10-CM

## 2022-11-23 DIAGNOSIS — J96.11 CHRONIC RESPIRATORY FAILURE WITH HYPOXIA (HCC): Primary | ICD-10-CM

## 2022-11-23 DIAGNOSIS — R06.02 SHORTNESS OF BREATH: ICD-10-CM

## 2022-11-23 DIAGNOSIS — J43.1 PANLOBULAR EMPHYSEMA (HCC): ICD-10-CM

## 2022-11-23 DIAGNOSIS — J84.10 PULMONARY FIBROSIS (HCC): ICD-10-CM

## 2022-11-23 PROCEDURE — 3023F SPIROM DOC REV: CPT | Performed by: INTERNAL MEDICINE

## 2022-11-23 PROCEDURE — 1036F TOBACCO NON-USER: CPT | Performed by: INTERNAL MEDICINE

## 2022-11-23 PROCEDURE — G8420 CALC BMI NORM PARAMETERS: HCPCS | Performed by: INTERNAL MEDICINE

## 2022-11-23 PROCEDURE — G8427 DOCREV CUR MEDS BY ELIG CLIN: HCPCS | Performed by: INTERNAL MEDICINE

## 2022-11-23 PROCEDURE — 1123F ACP DISCUSS/DSCN MKR DOCD: CPT | Performed by: INTERNAL MEDICINE

## 2022-11-23 PROCEDURE — G8484 FLU IMMUNIZE NO ADMIN: HCPCS | Performed by: INTERNAL MEDICINE

## 2022-11-23 PROCEDURE — 99214 OFFICE O/P EST MOD 30 MIN: CPT | Performed by: INTERNAL MEDICINE

## 2022-11-23 RX ORDER — HYDROCHLOROTHIAZIDE 25 MG/1
TABLET ORAL
COMMUNITY
Start: 2022-11-15

## 2022-11-23 ASSESSMENT — ENCOUNTER SYMPTOMS
EYE ITCHING: 0
NAUSEA: 0
VOMITING: 0
DIARRHEA: 0
WHEEZING: 1
VOICE CHANGE: 0
ABDOMINAL PAIN: 0
RHINORRHEA: 0
SORE THROAT: 0
COUGH: 1
SHORTNESS OF BREATH: 1
CHEST TIGHTNESS: 0

## 2022-11-23 NOTE — PROGRESS NOTES
Subjective:     Divya Phillips is a 80 y.o. male who complains today of:     Chief Complaint   Patient presents with    Follow-up     6m f/u on respiratory failure with hypoxia       HPI  He is using 3 lit O2 , PFT in past  show mild COPD, DLCO severely impaired. He is currently on 3 lit with sleep  and  Activity. He is on albuterol HFA  2 puff with spacer. C/o shortness of breath with exertion. C/o Cough  With white clear mucus in AM   No  Wheezing,   No Chest tightness   No Chest pain with radiation  or pleuritic pain  No  leg edema   No orthopnea  No Fever or chills. No Rhinorrhea and postnasal drip. He will do CXR , does not want HRCT chest at this time.      Allergies:  Ticagrelor  Past Medical History:   Diagnosis Date    Arthritis     CAD (coronary artery disease)     Hyperlipidemia     Hypertension     Lung disease      Past Surgical History:   Procedure Laterality Date    CAROTID STENT PLACEMENT       Family History   Problem Relation Age of Onset    Cancer Father     Stroke Father      Social History     Socioeconomic History    Marital status:      Spouse name: Not on file    Number of children: Not on file    Years of education: Not on file    Highest education level: Not on file   Occupational History    Not on file   Tobacco Use    Smoking status: Former    Smokeless tobacco: Never    Tobacco comments:     quit smoking 1981   Vaping Use    Vaping Use: Never used   Substance and Sexual Activity    Alcohol use: No    Drug use: No    Sexual activity: Not Currently     Partners: Female   Other Topics Concern    Not on file   Social History Narrative    Not on file     Social Determinants of Health     Financial Resource Strain: Not on file   Food Insecurity: Not on file   Transportation Needs: Not on file   Physical Activity: Not on file   Stress: Not on file   Social Connections: Not on file   Intimate Partner Violence: Not on file   Housing Stability: Not on file         Review of Systems   Constitutional:  Negative for chills, diaphoresis, fatigue and fever. HENT:  Negative for congestion, mouth sores, nosebleeds, postnasal drip, rhinorrhea, sneezing, sore throat and voice change. Eyes:  Negative for itching and visual disturbance. Respiratory:  Positive for cough, shortness of breath and wheezing. Negative for chest tightness. Cardiovascular: Negative. Negative for chest pain, palpitations and leg swelling. Gastrointestinal:  Negative for abdominal pain, diarrhea, nausea and vomiting. Genitourinary:  Negative for difficulty urinating and hematuria. Musculoskeletal:  Negative for arthralgias, joint swelling and myalgias. Skin:  Negative for rash. Allergic/Immunologic: Negative for environmental allergies. Neurological:  Negative for dizziness, tremors, weakness and headaches. Psychiatric/Behavioral:  Negative for behavioral problems and sleep disturbance.        :     Vitals:    11/23/22 0938   BP: 118/80   Site: Left Upper Arm   Position: Sitting   Cuff Size: Medium Adult   Pulse: 94   SpO2: 94%   Weight: 178 lb (80.7 kg)     Wt Readings from Last 3 Encounters:   11/23/22 178 lb (80.7 kg)   05/02/22 170 lb (77.1 kg)   11/05/21 170 lb (77.1 kg)         Physical Exam  Constitutional:       Appearance: He is well-developed. HENT:      Head: Normocephalic and atraumatic. Nose: Nose normal.   Eyes:      Conjunctiva/sclera: Conjunctivae normal.      Pupils: Pupils are equal, round, and reactive to light. Neck:      Thyroid: No thyromegaly. Vascular: No JVD. Trachea: No tracheal deviation. Cardiovascular:      Rate and Rhythm: Normal rate and regular rhythm. Heart sounds: No murmur heard. No friction rub. No gallop. Pulmonary:      Effort: Pulmonary effort is normal. No respiratory distress. Breath sounds: Rales present. No wheezing. Comments: diminished Breath sound bilaterally. Chest:      Chest wall: No tenderness. Abdominal:      General: There is no distension. Musculoskeletal:         General: Normal range of motion. Lymphadenopathy:      Cervical: No cervical adenopathy. Skin:     General: Skin is warm and dry. Findings: No rash. Neurological:      Mental Status: He is alert and oriented to person, place, and time. Cranial Nerves: No cranial nerve deficit. Psychiatric:         Behavior: Behavior normal.       Current Outpatient Medications   Medication Sig Dispense Refill    hydroCHLOROthiazide (HYDRODIURIL) 25 MG tablet take 1 tablet by mouth every morning      albuterol sulfate HFA (VENTOLIN HFA) 108 (90 Base) MCG/ACT inhaler Inhale 2 puffs into the lungs 4 times daily as needed for Wheezing 1 each 3    levothyroxine (SYNTHROID) 25 MCG tablet take 1 tablet by mouth every morning ON AN EMPTY STOMACH      losartan (COZAAR) 25 MG tablet Take 25 mg by mouth daily      dilTIAZem (DILACOR XR) 120 MG extended release capsule Take 120 mg by mouth daily      clopidogrel (PLAVIX) 75 MG tablet       atorvastatin (LIPITOR) 40 MG tablet Take 40 mg by mouth daily   0    aspirin 81 MG chewable tablet Take 81 mg by mouth daily      Multiple Vitamins-Minerals (PRESERVISION AREDS 2) CAPS Take 1 tablet by mouth daily      Fexofenadine HCl (ALLEGRA PO) Take by mouth (Patient not taking: Reported on 11/23/2022)      nitroGLYCERIN (NITROSTAT) 0.4 MG SL tablet up to max of 3 total doses. If no relief after 1 dose, call 911. (Patient not taking: Reported on 11/23/2022) 25 tablet 3     No current facility-administered medications for this visit. Results for orders placed during the hospital encounter of 07/11/18    XR CHEST STANDARD (2 VW)    Narrative  Chest X-ray, 2 views    Clinical information:  Shortness of breath    Comparison:  November 15, 2012    Findings    Osseous structures intact.  Cardiopericardial silhouette is normal. Pulmonary vasculature is normal. Lucent area projects cephalad and posterior to cardiac silhouette. Mild flattening the diaphragms and increased lung volumes. Stranding left lung base. Ill-defined area increased opacity right lung base. Impression    Bibasilar scarring versus subsegmental atelectasis/pneumonia. Emphysema      Results for orders placed during the hospital encounter of 11/15/12    X-ray chest PA and lateral    Narrative  X-RAY A CHEST, 2 VIEWS    CLINICAL HISTORY Cough and short of breath    COMPARISONS None available. FINDINGS  Normal cardiac silhouette. There are atherosclerotic  calcifications in the aortic arch. There is coarsening of interstitial  markings in the mid and lung bases, most pronounced in lung bases. No  consolidating infiltrate. No pleural effusion or pneumothorax. The bony  thorax is unremarkable. IMPRESSION COARSENED INTERSTITIAL MARKINGS. NO CONSOLIDATING INFILTRATE. Edmundo Jefferson By- Daylin Malave M.D. Released By- Daylin Malave M.D. Released Date Time- 11/15/12 1119  This document has been electronically signed. ------------------------------------------------------------------------------  ]  Results for orders placed during the hospital encounter of 05/05/21    XR CHEST PORTABLE    Narrative  EXAMINATION: XR CHEST PORTABLE    CLINICAL HISTORY: SHORTNESS OF BREATH. FIBROSIS AND COPD. COMPARISONS: HIGH-RESOLUTION CT CHEST AUGUST 27, 2020, JUNE 13, 2019 CHEST RADIOGRAPH, JULY 11, 2018    FINDINGS: Narrowing, bilateral glenohumeral joints. Cardiopericardial silhouette normal. Aorta calcified. Lungs hyperexpanded with relative lucency upper lung zones and crowding of vascular markings and reticular change at lung bases bilaterally. .    Impression  Findings compatible with emphysema with by basilar vascular crowding and interstitial lung change. However, cannot exclude superimposed bibasilar atelectasis/pneumonia. Assessment/Plan:     1.  Chronic respiratory failure with hypoxia (HCC)  He is using 3 lit O2 , continue O2 to keep saturation 90% or above. 2. Pulmonary fibrosis ([address]  He will do CXR , does not want HRCT chest at this time. - XR CHEST STANDARD (2 VW); Future    3. Chronic obstructive pulmonary disease with acute exacerbation (HCC)  PFT in past  show mild COPD, DLCO severely impaired. He is currently on 3 lit with sleep  and  Activity. He is on albuterol HFA  2 puff with spacer. C/o shortness of breath with exertion. C/o Cough  With white clear mucus in AM No  Wheezing, continue bronchodilator therapy as before    4. Shortness of breath  He has chronic exertional shortness of breath which is unchanged likely due to underlying COPD and hypoxia. Continue bronchodilator therapy and O2 as before    Return in about 4 months (around 3/23/2023) for CXR result, COPD, chronic respiratory failure, pulmonary fibrosis.       [id number] Lexie Wilson MD

## 2022-12-05 LAB
ALBUMIN SERPL-MCNC: 4.3 G/DL (ref 3.5–4.6)
ALP BLD-CCNC: 99 U/L (ref 35–104)
ALT SERPL-CCNC: 12 U/L (ref 0–41)
AST SERPL-CCNC: 18 U/L (ref 0–40)
BILIRUB SERPL-MCNC: 0.5 MG/DL (ref 0.2–0.7)
BILIRUBIN DIRECT: <0.2 MG/DL (ref 0–0.4)
BILIRUBIN, INDIRECT: NORMAL MG/DL (ref 0–0.6)
CHOLESTEROL, TOTAL: 127 MG/DL (ref 0–199)
HDLC SERPL-MCNC: 38 MG/DL (ref 40–59)
LDL CHOLESTEROL CALCULATED: 65 MG/DL (ref 0–129)
TOTAL PROTEIN: 6.9 G/DL (ref 6.3–8)
TRIGL SERPL-MCNC: 118 MG/DL (ref 0–150)

## 2023-01-13 NOTE — ED PROVIDER NOTES
[facility] ED  eMERGENCY dEPARTMENT eNCOUnter      Pt Name: Ann Keene  MRN: [id number]  [place] 1935  Date of evaluation: 5/5/2021  Provider: Dianne Randall MD    [address] [place] [address]       Chief Complaint   Patient presents with    Shortness of Breath     for the past 4 days increase SOB painful inspiration on the right side          HISTORY OF PRESENT ILLNESS   (Location/Symptom, Timing/Onset,Context/Setting, Quality, Duration, Modifying Factors, Severity)  Note limiting factors. Ann Keene is a 80 y.o. male who presents to the emergency department with history of COPD and fibrosis remote exposure to asbestosis as per patient and medical record being followed by lung doctors patient come to the emergency because increasing cough congestion increasing short of breath for the past 3 days time patient does not really much walk but as per patient slight exertion was making more worse and cough is becoming productive hurts in the ribs and chest when he coughs    HPI    NursingNotes were reviewed. REVIEW OF SYSTEMS    (2-9 systems for level 4, 10 or more for level 5)     Review of Systems    Except as noted above the remainder of the review of systems was reviewed and negative.        PAST MEDICAL HISTORY     Past Medical History:   Diagnosis Date    Arthritis     CAD (coronary artery disease)     Hyperlipidemia     Hypertension     Lung disease          SURGICALHISTORY       Past Surgical History:   Procedure Laterality Date    CAROTID STENT PLACEMENT           CURRENT MEDICATIONS       Previous Medications    ASPIRIN 81 MG CHEWABLE TABLET    Take 81 mg by mouth daily    ATORVASTATIN (LIPITOR) 40 MG TABLET    Take 40 mg by mouth daily     CETIRIZINE (ZYRTEC) 5 MG TABLET    Take 5 mg by mouth daily    CLOPIDOGREL (PLAVIX) 75 MG TABLET        DILTIAZEM (CARDIZEM CD) 180 MG EXTENDED RELEASE CAPSULE        FEXOFENADINE HCL (ALLEGRA PO)    Take by mouth    LEVOTHYROXINE (SYNTHROID) 25 MCG Active respiratory infection: No  Fever: No  Head lice: No  Bedbugs: No   Needs wheelchair: No  Needs Oxygen: No    TABLET    take 1 tablet by mouth every morning ON AN EMPTY STOMACH    LOSARTAN (COZAAR) 25 MG TABLET    Take 25 mg by mouth daily    MULTIPLE VITAMINS-MINERALS (PRESERVISION AREDS 2) CAPS    Take 1 tablet by mouth daily    NITROGLYCERIN (NITROSTAT) 0.4 MG SL TABLET    up to max of 3 total doses. If no relief after 1 dose, call 911. ALLERGIES     Patient has no known allergies.     FAMILY HISTORY       Family History   Problem Relation Age of Onset    Cancer Father     Stroke Father           SOCIAL HISTORY       Social History     Socioeconomic History    Marital status:      Spouse name: Not on file    Number of children: Not on file    Years of education: Not on file    Highest education level: Not on file   Occupational History    Not on file   Social Needs    Financial resource strain: Not on file    Food insecurity     Worry: Not on file     Inability: Not on file    Transportation needs     Medical: Not on file     Non-medical: Not on file   Tobacco Use    Smoking status: Former Smoker    Smokeless tobacco: Never Used    Tobacco comment: quit smoking 1981   Substance and Sexual Activity    Alcohol use: No    Drug use: No    Sexual activity: Not Currently     Partners: Female   Lifestyle    Physical activity     Days per week: Not on file     Minutes per session: Not on file    Stress: Not on file   Relationships    Social connections     Talks on phone: Not on file     Gets together: Not on file     Attends Rastafarian service: Not on file     Active member of club or organization: Not on file     Attends meetings of clubs or organizations: Not on file     Relationship status: Not on file    Intimate partner violence     Fear of current or ex partner: Not on file     Emotionally abused: Not on file     Physically abused: Not on file     Forced sexual activity: Not on file   Other Topics Concern    Not on file   Social History Narrative    Not on file       SCREENINGS    [place] Coma Scale  Eye Opening: Spontaneous  Best Verbal Response: Oriented  Best Motor Response: Obeys commands  [place] Coma Scale Score: 15 @FLOW([id number])@      PHYSICAL EXAM    (up to 7 for level 4, 8 or more for level 5)     ED Triage Vitals [05/05/21 1120]   BP Temp Temp Source Pulse Resp SpO2 Height Weight   (!) 170/101 98.1 °F (36.7 °C) Oral 106 16 (!) 84 % 6' (1.829 m) 171 lb (77.6 kg)       Physical Exam    DIAGNOSTIC RESULTS     EKG: All EKG's are interpreted by the Emergency Department Physician who either signs or Co-signsthis chart in the absence of a cardiologist.        RADIOLOGY:   Non-plain filmimages such as CT, Ultrasound and MRI are read by the radiologist. Plain radiographic images are visualized and preliminarily interpreted by the emergency physician with the below findings:        Interpretation per the Radiologist below, if available at the time ofthis note:    XR CHEST PORTABLE   Final Result      Findings compatible with emphysema with by basilar vascular crowding and interstitial lung change. However, cannot exclude superimposed bibasilar atelectasis/pneumonia.             ED BEDSIDE ULTRASOUND:   Performed by ED Physician - none    LABS:  Labs Reviewed   COMPREHENSIVE METABOLIC PANEL - Abnormal; Notable for the following components:       Result Value    Glucose 144 (*)     CREATININE 1.32 (*)     GFR Non- 51.4 (*)     Calcium 10.4 (*)     Total Protein 8.7 (*)     Alkaline Phosphatase 120 (*)     Globulin 4.6 (*)     All other components within normal limits   CBC WITH AUTO DIFFERENTIAL - Abnormal; Notable for the following components:    WBC 11.8 (*)     RBC 4.51 (*)     Hemoglobin 13.5 (*)     Hematocrit 40.2 (*)     Neutrophils Absolute 9.8 (*)     Lymphocytes Absolute 0.8 (*)     All other components within normal limits   COVID-19, RAPID   CULTURE, BLOOD 1   CULTURE, BLOOD 2   MAGNESIUM   TROPONIN   BRAIN NATRIURETIC PEPTIDE   PROTIME-INR   LACTIC ACID, PLASMA       All other labs were within normal range or not returned as of this dictation. EMERGENCY DEPARTMENT COURSE and DIFFERENTIAL DIAGNOSIS/MDM:   Vitals:    Vitals:    05/05/21 1120 05/05/21 1130 05/05/21 1148 05/05/21 1253   BP: (!) 170/101  (!) 145/83 (!) 152/83   Pulse: 106 86 90 86   Resp: 16  21 20   Temp: 98.1 °F (36.7 °C)      TempSrc: Oral      SpO2: (!) 84%  98% 97%   Weight: 171 lb (77.6 kg)      Height: 6' (1.829 m)              MDM  Number of Diagnoses or Management Options  COPD exacerbation [facility]): established and improving  Pneumonia due to organism: established and improving  Diagnosis management comments: With the home oxygen as needed has been using oxygen at nighttime last 3 days time increasing short of breath with productive cough hoarseness chest when he coughs no swelling of the legs history of COPD and asbestosis being followed by lung doctors patient EKG performed normal sinus rhythm left axis deviation with left ventricular hypertrophy patient rate of 84/min NE interval 160 ms QRS duration 1 1 2 ms QT interval 364 ms patient with suspected infiltrates in the lungs as per radiologist COPD exacerbation patient Case discussed with hospitalist need to come into the hospital for few days for increasing exacerbation of COPD and suspected pneumonia antibiotic started patient agrees with the plan       Amount and/or Complexity of Data Reviewed  Clinical lab tests: ordered and reviewed  Tests in the radiology section of CPT®: ordered and reviewed        CRITICAL CARE TIME   Total Critical Care time was  minutes, excluding separately reportableprocedures. There was a high probability of clinicallysignificant/life threatening deterioration in the patient's condition which required my urgent intervention. NSULTS:  None    PROCEDURES:  Unless otherwise noted below, none     Procedures    FINAL IMPRESSION      1. COPD exacerbation ([address]    2.  Pneumonia due to organism          DISPOSITION/PLAN   DISPOSITION Decision To Admit 05/05/2021 01:00:13 PM      PATIENT REFERRED TO:  No follow-up provider specified.     DISCHARGE MEDICATIONS:  New Prescriptions    No medications on file          (Please note that portions of this note were completed with a voice recognition program.  Efforts were made to edit the dictations but occasionally words are mis-transcribed.)    Jolene Rose MD (electronically signed)  Attending Emergency Physician       Jolene Rose MD  05/05/21 [id number]

## 2023-05-22 ENCOUNTER — HOSPITAL ENCOUNTER (OUTPATIENT)
Dept: GENERAL RADIOLOGY | Age: 88
Discharge: HOME | End: 2023-05-24
Payer: MEDICARE

## 2023-05-22 ENCOUNTER — HOSPITAL ENCOUNTER (OUTPATIENT)
Age: 88
Discharge: HOME | End: 2023-05-24
Payer: MEDICARE

## 2023-05-22 DIAGNOSIS — J44.1 CHRONIC OBSTRUCTIVE PULMONARY DISEASE WITH ACUTE EXACERBATION (HCC): ICD-10-CM

## 2023-05-22 DIAGNOSIS — J84.10 PULMONARY FIBROSIS (HCC): ICD-10-CM

## 2023-05-22 PROCEDURE — 71046 X-RAY EXAM CHEST 2 VIEWS: CPT

## 2023-05-24 ENCOUNTER — OFFICE VISIT (OUTPATIENT)
Dept: PULMONOLOGY | Age: 88
End: 2023-05-24
Payer: MEDICARE

## 2023-05-24 VITALS
TEMPERATURE: 97.3 F | DIASTOLIC BLOOD PRESSURE: 80 MMHG | OXYGEN SATURATION: 91 % | SYSTOLIC BLOOD PRESSURE: 122 MMHG | WEIGHT: 175 LBS | BODY MASS INDEX: 23.73 KG/M2 | HEART RATE: 86 BPM

## 2023-05-24 DIAGNOSIS — J96.11 CHRONIC RESPIRATORY FAILURE WITH HYPOXIA (HCC): Primary | ICD-10-CM

## 2023-05-24 DIAGNOSIS — J44.1 CHRONIC OBSTRUCTIVE PULMONARY DISEASE WITH ACUTE EXACERBATION (HCC): ICD-10-CM

## 2023-05-24 DIAGNOSIS — J84.10 PULMONARY FIBROSIS (HCC): ICD-10-CM

## 2023-05-24 PROCEDURE — 1036F TOBACCO NON-USER: CPT | Performed by: INTERNAL MEDICINE

## 2023-05-24 PROCEDURE — 3023F SPIROM DOC REV: CPT | Performed by: INTERNAL MEDICINE

## 2023-05-24 PROCEDURE — 99214 OFFICE O/P EST MOD 30 MIN: CPT | Performed by: INTERNAL MEDICINE

## 2023-05-24 PROCEDURE — G8427 DOCREV CUR MEDS BY ELIG CLIN: HCPCS | Performed by: INTERNAL MEDICINE

## 2023-05-24 PROCEDURE — 1123F ACP DISCUSS/DSCN MKR DOCD: CPT | Performed by: INTERNAL MEDICINE

## 2023-05-24 PROCEDURE — G8420 CALC BMI NORM PARAMETERS: HCPCS | Performed by: INTERNAL MEDICINE

## 2023-05-24 ASSESSMENT — ENCOUNTER SYMPTOMS
DIARRHEA: 0
NAUSEA: 0
WHEEZING: 1
VOMITING: 0
EYE ITCHING: 0
ABDOMINAL PAIN: 0
SORE THROAT: 0
SHORTNESS OF BREATH: 1
RHINORRHEA: 0
VOICE CHANGE: 0
COUGH: 1
CHEST TIGHTNESS: 0

## 2023-07-14 LAB
ALBUMIN SERPL-MCNC: 4.2 G/DL (ref 3.5–4.6)
ALP SERPL-CCNC: 109 U/L (ref 35–104)
ALT SERPL-CCNC: 10 U/L (ref 0–41)
ANION GAP SERPL CALCULATED.3IONS-SCNC: 11 MEQ/L (ref 9–15)
AST SERPL-CCNC: 18 U/L (ref 0–40)
BILIRUB SERPL-MCNC: 0.5 MG/DL (ref 0.2–0.7)
BUN SERPL-MCNC: 30 MG/DL (ref 8–23)
CALCIUM SERPL-MCNC: 9.9 MG/DL (ref 8.5–9.9)
CHLORIDE SERPL-SCNC: 107 MEQ/L (ref 95–107)
CHOLEST SERPL-MCNC: 120 MG/DL (ref 0–199)
CO2 SERPL-SCNC: 26 MEQ/L (ref 20–31)
CREAT SERPL-MCNC: 1.54 MG/DL (ref 0.7–1.2)
ERYTHROCYTE [DISTWIDTH] IN BLOOD BY AUTOMATED COUNT: 15 % (ref 11.5–14.5)
GLOBULIN SER CALC-MCNC: 2.8 G/DL (ref 2.3–3.5)
GLUCOSE SERPL-MCNC: 90 MG/DL (ref 70–99)
HCT VFR BLD AUTO: 42.1 % (ref 42–52)
HDLC SERPL-MCNC: 37 MG/DL (ref 40–59)
HGB BLD-MCNC: 14 G/DL (ref 14–18)
LDLC SERPL CALC-MCNC: 62 MG/DL (ref 0–129)
MCH RBC QN AUTO: 30.8 PG (ref 27–31.3)
MCHC RBC AUTO-ENTMCNC: 33.2 % (ref 33–37)
MCV RBC AUTO: 92.7 FL (ref 79–92.2)
PLATELET # BLD AUTO: 202 K/UL (ref 130–400)
POTASSIUM SERPL-SCNC: 5.1 MEQ/L (ref 3.4–4.9)
PROT SERPL-MCNC: 7 G/DL (ref 6.3–8)
RBC # BLD AUTO: 4.54 M/UL (ref 4.7–6.1)
SODIUM SERPL-SCNC: 144 MEQ/L (ref 135–144)
TRIGL SERPL-MCNC: 103 MG/DL (ref 0–150)
WBC # BLD AUTO: 9.7 K/UL (ref 4.8–10.8)

## 2023-09-27 ENCOUNTER — OFFICE VISIT (OUTPATIENT)
Dept: PULMONOLOGY | Age: 88
End: 2023-09-27
Payer: MEDICARE

## 2023-09-27 VITALS
SYSTOLIC BLOOD PRESSURE: 124 MMHG | HEART RATE: 93 BPM | WEIGHT: 170 LBS | TEMPERATURE: 97.1 F | DIASTOLIC BLOOD PRESSURE: 84 MMHG | BODY MASS INDEX: 23.06 KG/M2 | OXYGEN SATURATION: 90 %

## 2023-09-27 DIAGNOSIS — J44.1 CHRONIC OBSTRUCTIVE PULMONARY DISEASE WITH ACUTE EXACERBATION (HCC): ICD-10-CM

## 2023-09-27 DIAGNOSIS — J96.11 CHRONIC RESPIRATORY FAILURE WITH HYPOXIA (HCC): Primary | ICD-10-CM

## 2023-09-27 DIAGNOSIS — J84.10 PULMONARY FIBROSIS (HCC): ICD-10-CM

## 2023-09-27 PROCEDURE — 1123F ACP DISCUSS/DSCN MKR DOCD: CPT | Performed by: INTERNAL MEDICINE

## 2023-09-27 PROCEDURE — 3023F SPIROM DOC REV: CPT | Performed by: INTERNAL MEDICINE

## 2023-09-27 PROCEDURE — 1036F TOBACCO NON-USER: CPT | Performed by: INTERNAL MEDICINE

## 2023-09-27 PROCEDURE — G8420 CALC BMI NORM PARAMETERS: HCPCS | Performed by: INTERNAL MEDICINE

## 2023-09-27 PROCEDURE — 99214 OFFICE O/P EST MOD 30 MIN: CPT | Performed by: INTERNAL MEDICINE

## 2023-09-27 PROCEDURE — G8427 DOCREV CUR MEDS BY ELIG CLIN: HCPCS | Performed by: INTERNAL MEDICINE

## 2023-09-27 RX ORDER — ALBUTEROL SULFATE 90 UG/1
2 AEROSOL, METERED RESPIRATORY (INHALATION) 4 TIMES DAILY PRN
Qty: 1 EACH | Refills: 3 | Status: SHIPPED | OUTPATIENT
Start: 2023-09-27

## 2023-09-27 ASSESSMENT — ENCOUNTER SYMPTOMS
NAUSEA: 0
EYE ITCHING: 0
DIARRHEA: 0
WHEEZING: 0
VOMITING: 0
CHEST TIGHTNESS: 0
VOICE CHANGE: 0
SHORTNESS OF BREATH: 1
SORE THROAT: 0
ABDOMINAL PAIN: 0
COUGH: 1
RHINORRHEA: 0

## 2023-09-27 NOTE — PROGRESS NOTES
Subjective:     Tomasa Lee is a 80 y.o. male who complains today of:     Chief Complaint   Patient presents with    Follow-up     4m f/u on COPD, chronic respiratory failure with hypoxia        HPI  He is currently on 3 lit with Sleep  and Activity. He is on albuterol HFA  2 puff with spacer. C/o shortness of breath with exertion. C/o Cough  With white clear mucus in AM   No  Wheezing,   No Chest tightness   No Chest pain with radiation  or pleuritic pain  No  leg edema   No orthopnea  No Fever or chills. No Rhinorrhea and postnasal drip. CXR 5/22/23 Mild hyperinflation with chronic subpleural reticular markings. PFT in past  show mild COPD, DLCO severely impaired.      Allergies:  Ticagrelor  Past Medical History:   Diagnosis Date    Arthritis     CAD (coronary artery disease)     Hyperlipidemia     Hypertension     Lung disease      Past Surgical History:   Procedure Laterality Date    CAROTID STENT PLACEMENT       Family History   Problem Relation Age of Onset    Cancer Father     Stroke Father      Social History     Socioeconomic History    Marital status:      Spouse name: Not on file    Number of children: Not on file    Years of education: Not on file    Highest education level: Not on file   Occupational History    Not on file   Tobacco Use    Smoking status: Former    Smokeless tobacco: Never    Tobacco comments:     quit smoking 1981   Vaping Use    Vaping Use: Never used   Substance and Sexual Activity    Alcohol use: No    Drug use: No    Sexual activity: Not Currently     Partners: Female   Other Topics Concern    Not on file   Social History Narrative    Not on file     Social Determinants of Health     Financial Resource Strain: Not on file   Food Insecurity: Not on file   Transportation Needs: Not on file   Physical Activity: Not on file   Stress: Not on file   Social Connections: Not on file   Intimate Partner Violence: Not on file   Housing Stability: Not on file

## 2024-05-22 ENCOUNTER — OFFICE VISIT (OUTPATIENT)
Dept: PULMONOLOGY | Age: 89
End: 2024-05-22
Payer: MEDICARE

## 2024-05-22 VITALS
TEMPERATURE: 97.5 F | OXYGEN SATURATION: 90 % | DIASTOLIC BLOOD PRESSURE: 84 MMHG | BODY MASS INDEX: 21.97 KG/M2 | HEART RATE: 95 BPM | WEIGHT: 162 LBS | SYSTOLIC BLOOD PRESSURE: 132 MMHG

## 2024-05-22 DIAGNOSIS — J84.10 PULMONARY FIBROSIS (HCC): ICD-10-CM

## 2024-05-22 DIAGNOSIS — J44.1 CHRONIC OBSTRUCTIVE PULMONARY DISEASE WITH ACUTE EXACERBATION (HCC): ICD-10-CM

## 2024-05-22 DIAGNOSIS — J96.11 CHRONIC RESPIRATORY FAILURE WITH HYPOXIA (HCC): Primary | ICD-10-CM

## 2024-05-22 DIAGNOSIS — R06.02 SHORTNESS OF BREATH: ICD-10-CM

## 2024-05-22 PROCEDURE — G8427 DOCREV CUR MEDS BY ELIG CLIN: HCPCS | Performed by: INTERNAL MEDICINE

## 2024-05-22 PROCEDURE — 99214 OFFICE O/P EST MOD 30 MIN: CPT | Performed by: INTERNAL MEDICINE

## 2024-05-22 PROCEDURE — 4004F PT TOBACCO SCREEN RCVD TLK: CPT | Performed by: INTERNAL MEDICINE

## 2024-05-22 PROCEDURE — 3023F SPIROM DOC REV: CPT | Performed by: INTERNAL MEDICINE

## 2024-05-22 PROCEDURE — G8420 CALC BMI NORM PARAMETERS: HCPCS | Performed by: INTERNAL MEDICINE

## 2024-05-22 PROCEDURE — 1123F ACP DISCUSS/DSCN MKR DOCD: CPT | Performed by: INTERNAL MEDICINE

## 2024-05-22 NOTE — PROGRESS NOTES
FIBROSIS AND COPD.    COMPARISONS: HIGH-RESOLUTION CT CHEST AUGUST 27, 2020, JUNE 13, 2019 CHEST RADIOGRAPH, JULY 11, 2018    FINDINGS: Narrowing, bilateral glenohumeral joints. Cardiopericardial silhouette normal. Aorta calcified. Lungs hyperexpanded with relative lucency upper lung zones and crowding of vascular markings and reticular change at lung bases bilaterally..    Impression  Findings compatible with emphysema with by basilar vascular crowding and interstitial lung change. However, cannot exclude superimposed bibasilar atelectasis/pneumonia.      Assessment/Plan:     1. Chronic respiratory failure with hypoxia (HCC)  He is currently on 3 lit with Sleep  and Activity.Continue O2 to keep Spo2 90% or above.       2. Chronic obstructive pulmonary disease with acute exacerbation (HCC)  He is on albuterol HFA  2 puff with spacer.C/o shortness of breath with exertion. C/o Cough  With white clear mucus in AM. No  Wheezing, No Chest tightness   CXR 5/22/23 Mild hyperinflation with chronic subpleural reticular markings. And PFT in past  show mild COPD, DLCO severely impaired.     - XR CHEST STANDARD (2 VW); Future  - Full PFT Study With Bronchodilator; Future    3. Pulmonary fibrosis (HCC)   CXR in past show chronic subpleural reticular markings , CT chest in past Peripheral lung    most common in left upper lobe  with mild associated septal wall thickening identified. Reticular change visualized  left lung base with bulla. Mild honeycombing evident. No groundglass opacities.     4. Shortness of breath  He having  Chronic shortness of breath which is likely due to COPD, continue  Bronchodilator, O2 as before. keep  Spo2 90% or above.        Return in about 4 months (around 9/22/2024) for COPD, chronic respiratory failure.      Lenard Gutiérrez MD

## 2024-08-20 ENCOUNTER — TRANSCRIBE ORDERS (OUTPATIENT)
Dept: CARDIOLOGY | Facility: CLINIC | Age: 89
End: 2024-08-20
Payer: MEDICARE

## 2024-08-20 ENCOUNTER — TELEPHONE (OUTPATIENT)
Dept: CARDIOLOGY | Facility: CLINIC | Age: 89
End: 2024-08-20
Payer: MEDICARE

## 2024-08-20 DIAGNOSIS — I12.0 BENIGN HYPERTENSIVE KIDNEY DISEASE WITH CHRONIC KIDNEY DISEASE STAGE V OR END STAGE RENAL DISEASE (MULTI): ICD-10-CM

## 2024-08-20 DIAGNOSIS — I10 PRIMARY HYPERTENSION: ICD-10-CM

## 2024-08-20 DIAGNOSIS — E78.2 MIXED HYPERLIPIDEMIA: ICD-10-CM

## 2024-08-20 DIAGNOSIS — N18.31 STAGE 3A CHRONIC KIDNEY DISEASE (CKD) (MULTI): ICD-10-CM

## 2024-08-20 DIAGNOSIS — Z79.899 MEDICATION COURSE CHANGED: ICD-10-CM

## 2024-08-20 NOTE — TELEPHONE ENCOUNTER
ORDERS TRANSCRIBED. CALLED TO PATIENT TO ADVISE OF MESSAGE AND UNABLE TO LEAVE VOICEMAIL. LAB ORDERS FAXED TO [facility] AND MAILED TO PATIENT.   Ericka Acevedo MA

## 2024-08-20 NOTE — TELEPHONE ENCOUNTER
----- Message from Anabel Girish sent at 2024 11:33 AM EDT -----  Regarding: LAB ORDERS  PATIENT HAS UPCOMING APPT 1894 WITH DR. MADDOX. HIS LAB ORDERS ARE , NEW ONES NEED PLACED. HE IS GOING TO [facility] TO HAVE THEM DRAWN. THANKS

## 2024-08-27 ENCOUNTER — APPOINTMENT (OUTPATIENT)
Dept: CARDIOLOGY | Facility: CLINIC | Age: 89
End: 2024-08-27
Payer: MEDICARE

## 2024-08-27 VITALS
HEART RATE: 90 BPM | DIASTOLIC BLOOD PRESSURE: 80 MMHG | WEIGHT: 158.8 LBS | HEIGHT: 72 IN | BODY MASS INDEX: 21.51 KG/M2 | SYSTOLIC BLOOD PRESSURE: 132 MMHG

## 2024-08-27 DIAGNOSIS — J96.11 CHRONIC HYPOXIC RESPIRATORY FAILURE (MULTI): ICD-10-CM

## 2024-08-27 DIAGNOSIS — E78.2 MIXED HYPERLIPIDEMIA: ICD-10-CM

## 2024-08-27 DIAGNOSIS — I10 PRIMARY HYPERTENSION: ICD-10-CM

## 2024-08-27 DIAGNOSIS — N18.32 CKD STAGE 3B, GFR 30-44 ML/MIN (MULTI): ICD-10-CM

## 2024-08-27 DIAGNOSIS — I25.10 CORONARY ARTERY DISEASE INVOLVING NATIVE CORONARY ARTERY OF NATIVE HEART WITHOUT ANGINA PECTORIS: ICD-10-CM

## 2024-08-27 PROBLEM — N18.31 CKD STAGE 3A, GFR 45-59 ML/MIN (MULTI): Status: ACTIVE | Noted: 2024-08-27

## 2024-08-27 PROCEDURE — 1159F MED LIST DOCD IN RCRD: CPT | Performed by: INTERNAL MEDICINE

## 2024-08-27 PROCEDURE — 99214 OFFICE O/P EST MOD 30 MIN: CPT | Performed by: INTERNAL MEDICINE

## 2024-08-27 PROCEDURE — 1160F RVW MEDS BY RX/DR IN RCRD: CPT | Performed by: INTERNAL MEDICINE

## 2024-08-27 PROCEDURE — 3075F SYST BP GE 130 - 139MM HG: CPT | Performed by: INTERNAL MEDICINE

## 2024-08-27 PROCEDURE — 1036F TOBACCO NON-USER: CPT | Performed by: INTERNAL MEDICINE

## 2024-08-27 PROCEDURE — 3079F DIAST BP 80-89 MM HG: CPT | Performed by: INTERNAL MEDICINE

## 2024-08-27 RX ORDER — DILTIAZEM HYDROCHLORIDE 120 MG/1
120 CAPSULE, EXTENDED RELEASE ORAL DAILY
COMMUNITY

## 2024-08-27 RX ORDER — LEVOTHYROXINE SODIUM 25 UG/1
25 TABLET ORAL
COMMUNITY

## 2024-08-27 RX ORDER — HYDROCHLOROTHIAZIDE 25 MG/1
25 TABLET ORAL DAILY
COMMUNITY

## 2024-08-27 RX ORDER — NITROGLYCERIN 0.4 MG/1
0.4 TABLET SUBLINGUAL EVERY 5 MIN PRN
COMMUNITY
Start: 2023-10-30

## 2024-08-27 RX ORDER — ATORVASTATIN CALCIUM 80 MG/1
80 TABLET, FILM COATED ORAL DAILY
Qty: 90 TABLET | Refills: 3 | Status: SHIPPED | OUTPATIENT
Start: 2024-08-27 | End: 2025-08-27

## 2024-08-27 RX ORDER — MINERAL OIL
1 ENEMA (ML) RECTAL DAILY
COMMUNITY

## 2024-08-27 RX ORDER — CLOPIDOGREL BISULFATE 75 MG/1
75 TABLET ORAL DAILY
COMMUNITY

## 2024-08-27 NOTE — PROGRESS NOTES
1 year follow-up visit.    Subjective :     Accompanied by son Luis to the office.  No chest pressure tightness heaviness palpitations bleeding diathesis or falls  Remains on oxygen around-the-clock.  History so Far :  1.  Primary hypertension.  2. Abnormal pulmonary function study probable COPD followed by primary service, patient has chronic hypoxemic respiratory failure functional class III.  3. Chronic kidney disease at age 3a,stable  4. Hyperlipidemia-at target  5. Atherosclerotic native vessel coronary artery disease with PCI and stenting of the left anterior descending artery in 2018, PCI and drug-eluting stent of the right coronary artery in April 2019, without angina pectoris  6. Chronic exertional shortness of breath functional class III-related to known COPD and emphysema, with markedly abnormal diffusion capacity.  7. Chronic hypoxemic respiratory insufficiency, clinically stable.  8. Tendency for hyperkalemia,potassium is 5.1 on 7/14/23.  Losartan was discontinued.  9. Excessive bruising  10. Myalgia, predominantly affecting upper extremities.       Objective   Wt Readings from Last 3 Encounters:   08/27/24 72 kg (158 lb 12.8 oz)   07/18/23 77.1 kg (170 lb)   07/12/22 77.6 kg (171 lb)            Vitals:    08/27/24 1427   BP: 132/80   BP Location: Left arm   Patient Position: Sitting   Pulse: 90   Weight: 72 kg (158 lb 12.8 oz)   Height: 1.829 m (6')                Physical Exam:    GENERAL APPEARANCE: in no acute distress.  CHEST: Symmetric and non-tender.  INTEGUMENT: Skin warm and dry multiple areas of superficial ecchymosis  HEENT: No gross abnormalities identified.No pallor or scleral icterus.  NECK: Supple, no JVD, no bruit.   NEURO/PSHCY: Alert and oriented x3; appropriate behavior and responses and responses  LUNGS: Scattered fine crepitations both posterior lung fields, this is a chronic finding  HEART: Rate and rhythm regular with no evident murmur; no gallop appreciated.  Heart sounds are  distant  ABDOMEN: Soft, non tender.  MUSCULOSKELETAL: No gross deformities.  EXTREMITIES: Warm  There is trace edema noted.    Meds:  Current Outpatient Medications   Medication Instructions    atorvastatin (LIPITOR) 80 mg, oral, Daily    clopidogrel (PLAVIX) 75 mg, oral, Daily    DILT- mg, oral, Daily    fexofenadine (Allegra Allergy) 180 mg tablet 1 tablet, oral, Daily    hydroCHLOROthiazide (HYDRODIURIL) 25 mg, oral, Daily    levothyroxine (SYNTHROID, LEVOXYL) 25 mcg, oral, Daily before breakfast, Take on an empty stomach.    nitroglycerin (NITROSTAT) 0.4 mg, sublingual, Every 5 min PRN    oxygen (O2) gas therapy 1 each, inhalation, Continuous          Allergies   Allergen Reactions    Ticagrelor Shortness of breath             LABS:    Lab Results   Component Value Date    HGBA1C 5.7 05/06/2021   Lipid profile August 2024-total cholesterol 144 triglycerides 103 HDL 40 LDL 83            Sodium 138 potassium 4.2 BUN 26 creatinine 1.82 GFR 35  GFR was 43 in July 2023.    Patient Active Problem List    Diagnosis Date Noted    Coronary artery disease involving native coronary artery of native heart without angina pectoris 08/27/2024    Chronic hypoxic respiratory failure (Multi) 08/27/2024    CKD stage 3a, GFR 45-59 ml/min (Multi) 08/27/2024    Body mass index (BMI) 21.0-21.9, adult 08/27/2024    Primary hypertension 08/27/2024    Mixed hyperlipidemia 08/27/2024                 Assessment:    1. Coronary artery disease involving native coronary artery of native heart without angina pectoris  Follow Up In Cardiology      2. Chronic hypoxic respiratory failure (Multi)  Follow Up In Cardiology      3. CKD stage 3b, GFR 30-44 ml/min (Multi)  Follow Up In Cardiology      4. Body mass index (BMI) 21.0-21.9, adult  Follow Up In Cardiology      5. Primary hypertension  Follow Up In Cardiology      6. Mixed hyperlipidemia  Follow Up In Cardiology    atorvastatin (Lipitor) 80 mg tablet         At last office visit  patient had complained of upper extremity aches and pains, we suggested a atorvastatin holiday, he said he took himself off the atorvastatin for a few weeks, maybe mild improvement in symptoms, he is back on atorvastatin 80 mg daily.    Atorvastatin was renewed for 1 year    There is slight deterioration in GFR, patient is now kidney disease stage IIIb previously   3a  Predominant issues are related to respiratory status.  No change in cardiac medications.  After further discussion, patient will follow-up with me on an as-needed basis.    Thank you Dr. Nguyen for allowing me to participate in Mr. Esposito's care, please do not hesitate to call if further questions arise,  Sincerely,    Sakina Mckenna MD [facility]          Provider Attestation - Scribe documentation    All medical record entries made by the Scribe were at my direction and personally dictated by me. I have reviewed the chart and agree that the record accurately reflects my personal performance of the history, physical exam, discussion and plan.

## 2024-09-30 ENCOUNTER — HOSPITAL ENCOUNTER (OUTPATIENT)
Dept: GENERAL RADIOLOGY | Age: 89
Discharge: HOME | End: 2024-10-02
Attending: INTERNAL MEDICINE
Payer: MEDICARE

## 2024-09-30 ENCOUNTER — HOSPITAL ENCOUNTER (OUTPATIENT)
Dept: PULMONOLOGY | Age: 89
Discharge: HOME | End: 2024-09-30
Attending: INTERNAL MEDICINE
Payer: MEDICARE

## 2024-09-30 DIAGNOSIS — J96.11 CHRONIC RESPIRATORY FAILURE WITH HYPOXIA: ICD-10-CM

## 2024-09-30 PROCEDURE — 6360000002 HC RX W HCPCS

## 2024-09-30 PROCEDURE — 94726 PLETHYSMOGRAPHY LUNG VOLUMES: CPT

## 2024-09-30 PROCEDURE — 94729 DIFFUSING CAPACITY: CPT

## 2024-09-30 PROCEDURE — 94060 EVALUATION OF WHEEZING: CPT

## 2024-09-30 PROCEDURE — 71046 X-RAY EXAM CHEST 2 VIEWS: CPT

## 2024-09-30 RX ORDER — ALBUTEROL SULFATE 0.83 MG/ML
SOLUTION RESPIRATORY (INHALATION)
Status: COMPLETED
Start: 2024-09-30 | End: 2024-09-30

## 2024-09-30 RX ADMIN — ALBUTEROL SULFATE 2.5 MG: 2.5 SOLUTION RESPIRATORY (INHALATION) at 14:29

## 2024-10-09 ENCOUNTER — OFFICE VISIT (OUTPATIENT)
Dept: PULMONOLOGY | Age: 89
End: 2024-10-09
Payer: MEDICARE

## 2024-10-09 VITALS
BODY MASS INDEX: 21.43 KG/M2 | OXYGEN SATURATION: 93 % | SYSTOLIC BLOOD PRESSURE: 124 MMHG | HEART RATE: 90 BPM | WEIGHT: 158 LBS | DIASTOLIC BLOOD PRESSURE: 84 MMHG

## 2024-10-09 DIAGNOSIS — J43.1 PANLOBULAR EMPHYSEMA (HCC): ICD-10-CM

## 2024-10-09 DIAGNOSIS — J84.10 PULMONARY FIBROSIS (HCC): ICD-10-CM

## 2024-10-09 DIAGNOSIS — J96.11 CHRONIC RESPIRATORY FAILURE WITH HYPOXIA: Primary | ICD-10-CM

## 2024-10-09 DIAGNOSIS — J44.1 CHRONIC OBSTRUCTIVE PULMONARY DISEASE WITH ACUTE EXACERBATION (HCC): ICD-10-CM

## 2024-10-09 DIAGNOSIS — R06.02 SHORTNESS OF BREATH: ICD-10-CM

## 2024-10-09 PROCEDURE — G8420 CALC BMI NORM PARAMETERS: HCPCS | Performed by: INTERNAL MEDICINE

## 2024-10-09 PROCEDURE — G8427 DOCREV CUR MEDS BY ELIG CLIN: HCPCS | Performed by: INTERNAL MEDICINE

## 2024-10-09 PROCEDURE — 4004F PT TOBACCO SCREEN RCVD TLK: CPT | Performed by: INTERNAL MEDICINE

## 2024-10-09 PROCEDURE — 3023F SPIROM DOC REV: CPT | Performed by: INTERNAL MEDICINE

## 2024-10-09 PROCEDURE — G8484 FLU IMMUNIZE NO ADMIN: HCPCS | Performed by: INTERNAL MEDICINE

## 2024-10-09 PROCEDURE — 99214 OFFICE O/P EST MOD 30 MIN: CPT | Performed by: INTERNAL MEDICINE

## 2024-10-09 PROCEDURE — 1123F ACP DISCUSS/DSCN MKR DOCD: CPT | Performed by: INTERNAL MEDICINE

## 2024-10-09 NOTE — PROGRESS NOTES
Subjective:             Rahul Catalan is a 89 y.o. male who complains today of:     Chief Complaint   Patient presents with    Follow-up     4m f/u on Chronic respiratory failure with hypoxia, CXR and PFT results        HPI  He is currently on 3 lit with Sleep  and Activity.  He is on albuterol HFA  2 puff with spacer but not able to tell difference so not using it .  C/o shortness of breath with exertion.   C/o Cough  with clear mucus in AM   No  Wheezing, No Chest tightness   No Chest pain with radiation  or pleuritic pain  No  leg edema No orthopnea. No Fever or chills.   No Rhinorrhea and postnasal drip.     9/30/24  1. Radiographic findings suggestive of COPD.  2. Bibasilar areas of atelectasis, patchy infiltrates.  3. Trace right pleural effusion.    9/30/24   PFT in past  show moderate COPD, DLCO severely impaired.     Allergies:  Ticagrelor  Past Medical History:   Diagnosis Date    Arthritis     CAD (coronary artery disease)     Hyperlipidemia     Hypertension     Lung disease      Past Surgical History:   Procedure Laterality Date    CAROTID STENT PLACEMENT       Family History   Problem Relation Age of Onset    Cancer Father     Stroke Father      Social History     Socioeconomic History    Marital status:      Spouse name: Not on file    Number of children: Not on file    Years of education: Not on file    Highest education level: Not on file   Occupational History    Not on file   Tobacco Use    Smoking status: Former    Smokeless tobacco: Never    Tobacco comments:     quit smoking 1981   Vaping Use    Vaping status: Never Used   Substance and Sexual Activity    Alcohol use: No    Drug use: No    Sexual activity: Not Currently     Partners: Female   Other Topics Concern    Not on file   Social History Narrative    Not on file     Social Determinants of Health     Financial Resource Strain: Not on file   Food Insecurity: Not on file   Transportation Needs: Not on file   Physical Activity: Not

## 2025-02-26 ENCOUNTER — TELEMEDICINE (OUTPATIENT)
Dept: PULMONOLOGY | Age: 89
End: 2025-02-26

## 2025-02-26 DIAGNOSIS — J44.1 CHRONIC OBSTRUCTIVE PULMONARY DISEASE WITH ACUTE EXACERBATION (HCC): ICD-10-CM

## 2025-02-26 DIAGNOSIS — R06.02 SHORTNESS OF BREATH: ICD-10-CM

## 2025-02-26 DIAGNOSIS — J84.10 PULMONARY FIBROSIS (HCC): ICD-10-CM

## 2025-02-26 DIAGNOSIS — J96.11 CHRONIC RESPIRATORY FAILURE WITH HYPOXIA (HCC): Primary | ICD-10-CM

## 2025-02-26 RX ORDER — ALBUTEROL SULFATE 90 UG/1
2 INHALANT RESPIRATORY (INHALATION) 4 TIMES DAILY PRN
Qty: 1 EACH | Refills: 3 | Status: SHIPPED | OUTPATIENT
Start: 2025-02-26

## 2025-02-26 ASSESSMENT — ENCOUNTER SYMPTOMS
COUGH: 1
NAUSEA: 0
RHINORRHEA: 0
SHORTNESS OF BREATH: 1
VOMITING: 0
EYE ITCHING: 0
WHEEZING: 0
ABDOMINAL PAIN: 0
DIARRHEA: 0
SORE THROAT: 0
CHEST TIGHTNESS: 0
VOICE CHANGE: 0

## 2025-02-26 NOTE — PROGRESS NOTES
MD ISAIAS   hydroCHLOROthiazide (HYDRODIURIL) 25 MG tablet take 1 tablet by mouth every morning  Leoncio Alvares MD   levothyroxine (SYNTHROID) 25 MCG tablet take 1 tablet by mouth every morning ON AN EMPTY STOMACH  Leoncio Alvares MD   Fexofenadine HCl (ALLEGRA PO) Take by mouth  Patient not taking: Reported on 5/22/2024  Leoncio Alvares MD   dilTIAZem (DILACOR XR) 120 MG extended release capsule Take 1 capsule by mouth daily  Leoncio Alvares MD   clopidogrel (PLAVIX) 75 MG tablet   Leoncio Alvares MD   atorvastatin (LIPITOR) 40 MG tablet Take 1 tablet by mouth daily  Leoncio Alvares MD   nitroGLYCERIN (NITROSTAT) 0.4 MG SL tablet up to max of 3 total doses. If no relief after 1 dose, call 911.  Brooklyn Peterson MD   Multiple Vitamins-Minerals (PRESERVISION AREDS 2) CAPS Take 1 tablet by mouth daily  Leoncio Alvares MD       Social History     Tobacco Use    Smoking status: Former    Smokeless tobacco: Never    Tobacco comments:     quit smoking 1981   Vaping Use    Vaping status: Never Used   Substance Use Topics    Alcohol use: No    Drug use: No            PHYSICAL EXAMINATION:  [ INSTRUCTIONS:  \"[x]\" Indicates a positive item  \"[]\" Indicates a negative item  -- DELETE ALL ITEMS NOT EXAMINED]  Vital Signs: (As obtained by patient/caregiver or practitioner observation)    Blood pressure-  Heart rate-    Respiratory rate-    Temperature-  Pulse oximetry-     Constitutional: [x] Appears well-developed and well-nourished [x] No apparent distress      [] Abnormal-   Mental status  [x] Alert and awake  [x] Oriented to person/place/time [x]Able to follow commands      Eyes:  EOM    [x]  Normal  [] Abnormal-  Sclera  [x]  Normal  [] Abnormal -         Discharge []  None visible  [] Abnormal -    HENT:   [x] Normocephalic, atraumatic.  [] Abnormal   [] Mouth/Throat: Mucous membranes are moist.     External Ears [] Normal  [] Abnormal-     Neck: [x] No visualized mass

## 2025-07-02 ENCOUNTER — OFFICE VISIT (OUTPATIENT)
Dept: PULMONOLOGY | Age: 89
End: 2025-07-02

## 2025-07-02 VITALS
DIASTOLIC BLOOD PRESSURE: 80 MMHG | WEIGHT: 158 LBS | OXYGEN SATURATION: 91 % | BODY MASS INDEX: 21.43 KG/M2 | SYSTOLIC BLOOD PRESSURE: 118 MMHG | HEART RATE: 88 BPM

## 2025-07-02 DIAGNOSIS — J84.10 PULMONARY FIBROSIS (HCC): ICD-10-CM

## 2025-07-02 DIAGNOSIS — R06.02 SHORTNESS OF BREATH: ICD-10-CM

## 2025-07-02 DIAGNOSIS — J96.11 CHRONIC RESPIRATORY FAILURE WITH HYPOXIA (HCC): Primary | ICD-10-CM

## 2025-07-02 DIAGNOSIS — J44.1 CHRONIC OBSTRUCTIVE PULMONARY DISEASE WITH ACUTE EXACERBATION (HCC): ICD-10-CM

## 2025-07-02 RX ORDER — ATORVASTATIN CALCIUM 80 MG/1
TABLET, FILM COATED ORAL
COMMUNITY
Start: 2025-06-26

## 2025-07-02 ASSESSMENT — ENCOUNTER SYMPTOMS
ABDOMINAL PAIN: 0
WHEEZING: 0
SORE THROAT: 0
VOMITING: 0
NAUSEA: 0
SHORTNESS OF BREATH: 1
VOICE CHANGE: 0
CHEST TIGHTNESS: 0
DIARRHEA: 0
EYE ITCHING: 0
RHINORRHEA: 0
COUGH: 1

## 2025-07-02 NOTE — PROGRESS NOTES
fluticasone-umeclidin-vilant (TRELEGY ELLIPTA) 100-62.5-25 MCG/ACT AEPB inhaler; Inhale 1 puff into the lungs daily  Dispense: 1 each; Refill: 3    3. Shortness of breath  Patient  is having shortness of breath with exertion, which is likely due to underlying COPD, .  Continue bronchodilator therapy as before.  Keep O2 sat above 90%        Return in about 3 months (around 10/2/2025) for COPD, chronic respiratory failure, pulmonary fibrosis.      Lenard Gutiérrez MD